# Patient Record
Sex: MALE | Race: WHITE | Employment: FULL TIME | URBAN - METROPOLITAN AREA
[De-identification: names, ages, dates, MRNs, and addresses within clinical notes are randomized per-mention and may not be internally consistent; named-entity substitution may affect disease eponyms.]

---

## 2019-04-02 ENCOUNTER — OFFICE VISIT (OUTPATIENT)
Dept: UROLOGY | Facility: CLINIC | Age: 34
End: 2019-04-02
Payer: COMMERCIAL

## 2019-04-02 VITALS
HEIGHT: 70 IN | WEIGHT: 175 LBS | HEART RATE: 60 BPM | BODY MASS INDEX: 25.05 KG/M2 | DIASTOLIC BLOOD PRESSURE: 79 MMHG | SYSTOLIC BLOOD PRESSURE: 142 MMHG

## 2019-04-02 DIAGNOSIS — N34.2 URETHRITIS: Primary | ICD-10-CM

## 2019-04-02 LAB
ALBUMIN UR-MCNC: NEGATIVE MG/DL
APPEARANCE UR: CLEAR
BILIRUB UR QL STRIP: NEGATIVE
COLOR UR AUTO: YELLOW
GLUCOSE UR STRIP-MCNC: NEGATIVE MG/DL
HGB UR QL STRIP: NEGATIVE
KETONES UR STRIP-MCNC: NEGATIVE MG/DL
LEUKOCYTE ESTERASE UR QL STRIP: NEGATIVE
NITRATE UR QL: NEGATIVE
PH UR STRIP: 6 PH (ref 5–7)
RBC #/AREA URNS AUTO: 1 /HPF (ref 0–2)
SOURCE: NORMAL
SP GR UR STRIP: 1.01 (ref 1–1.03)
UROBILINOGEN UR STRIP-MCNC: 0 MG/DL (ref 0–2)
WBC #/AREA URNS AUTO: <1 /HPF (ref 0–5)

## 2019-04-02 RX ORDER — CEPHALEXIN 500 MG/1
CAPSULE ORAL
Refills: 0 | COMMUNITY
Start: 2019-03-22 | End: 2019-04-02

## 2019-04-02 RX ORDER — FINASTERIDE 1 MG/1
1 TABLET, FILM COATED ORAL
COMMUNITY
Start: 2019-02-20

## 2019-04-02 ASSESSMENT — ENCOUNTER SYMPTOMS
DYSURIA: 1
DIFFICULTY URINATING: 0
HEMATURIA: 0
FLANK PAIN: 0

## 2019-04-02 ASSESSMENT — MIFFLIN-ST. JEOR: SCORE: 1750.04

## 2019-04-02 ASSESSMENT — PAIN SCALES - GENERAL: PAINLEVEL: NO PAIN (0)

## 2019-04-02 NOTE — PROGRESS NOTES
Chief Complaint:   Dysuria         History of Present Illness:   Niko Nance is a very pleasant 34 year old male who presents with a ~3 week history of dysuria, frequency, and urgency. This began in the setting of a new sexual relationship. He reports using condoms vigilantly, but did have one episode of unprotected anal intercourse. His symptoms came on subtly, eventually prompting him to be seen at a [facility] [place] two weeks ago while on vacation. UA/GC testing that time was negative, and he was diagnosed with urethritis. He was then seen by his PCP 10 days ago with ongoing symptoms. UA at that visit showed trace leukocytes and occult blood. A 7-day course of Keflex was prescribed, with anticipation of improved symptoms within 3-4 days.     Today, he reports symptoms have remained despite finishing his course of Keflex. He also complains of mild pelvic pressure and mild testicular discomfort. He denies fevers, nausea, vomiting, diarrhea. He also denies penile discharge, gross hematuria, or hematospermia.     He also points out a region of mild distention in his left inguinal region. He is unsure when this began. 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 Denies associated pain.           Past Medical History:   History reviewed. No pertinent past medical history.         Past Surgical History:   History reviewed. No pertinent surgical history.         Medications     Current Outpatient Medications   Medication     finasteride (PROPECIA) 1 MG tablet     No current facility-administered medications for this visit.             Family History:   -Reviewed. No significant family history.         Social History:     Social History     Socioeconomic History     Marital status: Single     Spouse name: Not on file     Number of children: Not on file     Years of education: Not on file     Highest education level: Not on file   Occupational History     Not on file   Social  "Needs     Financial resource strain: Not on file     Food insecurity:     Worry: Not on file     Inability: Not on file     Transportation needs:     Medical: Not on file     Non-medical: Not on file   Tobacco Use     Smoking status: Never Smoker     Smokeless tobacco: Never Used   Substance and Sexual Activity     Alcohol use: Not on file     Drug use: Not on file     Sexual activity: Not on file   Lifestyle     Physical activity:     Days per week: Not on file     Minutes per session: Not on file     Stress: Not on file   Relationships     Social connections:     Talks on phone: Not on file     Gets together: Not on file     Attends Adventism service: Not on file     Active member of club or organization: Not on file     Attends meetings of clubs or organizations: Not on file     Relationship status: Not on file     Intimate partner violence:     Fear of current or ex partner: Not on file     Emotionally abused: Not on file     Physically abused: Not on file     Forced sexual activity: Not on file   Other Topics Concern     Not on file   Social History Narrative     Not on file            Allergies:   Patient has no known allergies.         Review of Systems:  From intake questionnaire   Negative 14 system review except as noted on HPI, nurse's note.         Physical Exam:   Patient is a 34 year old  male   Vitals: Blood pressure 142/79, pulse 60, height 1.778 m (5' 10\"), weight 79.4 kg (175 lb).  General Appearance Adult: Alert, no acute distress, oriented  HENT: throat/mouth:normal, good dentition  Neck: No adenopathy,masses or thyromegaly  Lungs: no respiratory distress, or pursed lip breathing  Heart: No obvious jugular venous distension present  Abdomen: soft, nontender. Slight fullness in left inguinal region, consistent with inguinal hernia.  Body mass index is 25.11 kg/m .  Lymphatics: No cervical or supraclavicular adenopathy  Musculoskeltal: extremities normal, no peripheral edema.   Skin: no suspicious " lesions or rashes  Neuro: Alert, oriented, speech and mentation normal  Psych: affect and mood normal  Gait: Normal  : penis, scrotum, testes normal; nontender with palpation         Labs and Pathology:    I personally reviewed all applicable laboratory data and went over findings with patient  Significant for:    UA RESULTS: 3/22/19  COLOR                     Yellow Color Yellow    CLARITY                   Clear Clarity Clear    SPECIFIC GRAVITY,URINE    1.010, 1.015, 1.020, 1.025                  1.015    PH,URINE                  6.0, 7.0, 8.0, 5.5, 6.5, 7.5, 8.5                  5.5    UROBILINOGEN,QUALITATIVE  Normal EU/dl Normal    PROTEIN, URINE Negative mg/dL Negative    GLUCOSE, URINE Negative mg/dL Negative    KETONES,URINE             Negative mg/dL Negative    BILIRUBIN,URINE           Negative                  Negative    OCCULT BLOOD,URINE        Negative                  Trace    NITRITE                   Negative                  Negative    LEUKOCYTE ESTERASE        Negative                  Trace              Imaging:    Chart reviewed; no pertinent imaging to note.           Assessment and Plan:     Assessment: 34 year old male with ongoing urethritis-type symptoms despite completed course of Keflex. I suspect his symptoms are due to residual inflammation. We discussed additional testing options, and will pursue the following:     Plan:  -Mycoplasma and ureaplasma testing  -Repeat urinalysis with culture  -Will contact him by phone with results and next steps  -Patient planning to make follow up appointment with urologist in case his symptoms persist  -Advised to monitor left inguinal region. If becomes worse or painful, could address with PCP and consider surgical referral.        Diane Hernandez, CNP  Department of Urology

## 2019-04-02 NOTE — PATIENT INSTRUCTIONS
Urine sent for testing today.  Diane Hernandez CNP will notify you of the results.    Follow up with Dr. Ybarra if symptoms persist.    It was a pleasure meeting with you today.  Thank you for allowing me and my team the privilege of caring for you today.  YOU are the reason we are here, and I truly hope we provided you with the excellent service you deserve.  Please let us know if there is anything else we can do for you so that we can be sure you are leaving completely satisfied with your care experience.        SRINIVASAN Cummings

## 2019-04-02 NOTE — LETTER
4/2/2019       RE: Niko Nance  [address]  [facility] [address]     Dear Colleague,    Thank you for referring your patient, Niko Nance, to the [facility] UROLOGY AND INST FOR PROSTATE AND UROLOGIC CANCERS at [facility]. Please see a copy of my visit note below.            Chief Complaint:   Dysuria         History of Present Illness:   Niko Nance is a very pleasant 34 year old male who presents with a ~3 week history of dysuria, frequency, and urgency. This began in the setting of a new sexual relationship. He reports using condoms vigilantly, but did have one episode of unprotected anal intercourse. His symptoms came on subtly, eventually prompting him to be seen at a [facility] two weeks ago while on vacation. UA/GC testing that time was negative, and he was diagnosed with urethritis. He was then seen by his PCP 10 days ago with ongoing symptoms. UA at that visit showed trace leukocytes and occult blood. A 7-day course of Keflex was prescribed, with anticipation of improved symptoms within 3-4 days.     Today, he reports symptoms have remained despite finishing his course of Keflex. He also complains of mild pelvic pressure and mild testicular discomfort. He denies fevers, nausea, vomiting, diarrhea. He also denies penile discharge, gross hematuria, or hematospermia.     He also points out a region of mild distention in his left inguinal region. He is unsure when this began. 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 Denies associated pain.           Past Medical History:   History reviewed. No pertinent past medical history.         Past Surgical History:   History reviewed. No pertinent surgical history.         Medications     Current Outpatient Medications   Medication     finasteride (PROPECIA) 1 MG tablet     No current facility-administered medications for this visit.             Family History:   -Reviewed. No  "significant family history.         Social History:     Social History     Socioeconomic History     Marital status: Single     Spouse name: Not on file     Number of children: Not on file     Years of education: Not on file     Highest education level: Not on file   Occupational History     Not on file   Social Needs     Financial resource strain: Not on file     Food insecurity:     Worry: Not on file     Inability: Not on file     Transportation needs:     Medical: Not on file     Non-medical: Not on file   Tobacco Use     Smoking status: Never Smoker     Smokeless tobacco: Never Used   Substance and Sexual Activity     Alcohol use: Not on file     Drug use: Not on file     Sexual activity: Not on file   Lifestyle     Physical activity:     Days per week: Not on file     Minutes per session: Not on file     Stress: Not on file   Relationships     Social connections:     Talks on phone: Not on file     Gets together: Not on file     Attends Rastafari service: Not on file     Active member of club or organization: Not on file     Attends meetings of clubs or organizations: Not on file     Relationship status: Not on file     Intimate partner violence:     Fear of current or ex partner: Not on file     Emotionally abused: Not on file     Physically abused: Not on file     Forced sexual activity: Not on file   Other Topics Concern     Not on file   Social History Narrative     Not on file            Allergies:   Patient has no known allergies.         Review of Systems:  From intake questionnaire   Negative 14 system review except as noted on HPI, nurse's note.         Physical Exam:   Patient is a 34 year old  male   Vitals: Blood pressure 142/79, pulse 60, height 1.778 m (5' 10\"), weight 79.4 kg (175 lb).  General Appearance Adult: Alert, no acute distress, oriented  HENT: throat/mouth:normal, good dentition  Neck: No adenopathy,masses or thyromegaly  Lungs: no respiratory distress, or pursed lip breathing  Heart: " No obvious jugular venous distension present  Abdomen: soft, nontender. Slight fullness in left inguinal region, consistent with inguinal hernia.  Body mass index is 25.11 kg/m .  Lymphatics: No cervical or supraclavicular adenopathy  Musculoskeltal: extremities normal, no peripheral edema.   Skin: no suspicious lesions or rashes  Neuro: Alert, oriented, speech and mentation normal  Psych: affect and mood normal  Gait: Normal  : penis, scrotum, testes normal; nontender with palpation         Labs and Pathology:    I personally reviewed all applicable laboratory data and went over findings with patient  Significant for:    UA RESULTS: 3/22/19  COLOR                     Yellow Color Yellow    CLARITY                   Clear Clarity Clear    SPECIFIC GRAVITY,URINE    1.010, 1.015, 1.020, 1.025                  1.015    PH,URINE                  6.0, 7.0, 8.0, 5.5, 6.5, 7.5, 8.5                  5.5    UROBILINOGEN,QUALITATIVE  Normal EU/dl Normal    PROTEIN, URINE Negative mg/dL Negative    GLUCOSE, URINE Negative mg/dL Negative    KETONES,URINE             Negative mg/dL Negative    BILIRUBIN,URINE           Negative                  Negative    OCCULT BLOOD,URINE        Negative                  Trace    NITRITE                   Negative                  Negative    LEUKOCYTE ESTERASE        Negative                  Trace              Imaging:    Chart reviewed; no pertinent imaging to note.           Assessment and Plan:     Assessment: 34 year old male with ongoing urethritis-type symptoms despite completed course of Keflex. I suspect his symptoms are due to residual inflammation. We discussed additional testing options, and will pursue the following:     Plan:  -Mycoplasma and ureaplasma testing  -Repeat urinalysis with culture  -Will contact him by phone with results and next steps  -Patient planning to make follow up appointment with urologist in case his symptoms persist  -Advised to monitor left inguinal  region. If becomes worse or painful, could address with PCP and consider surgical referral.        Diane Hernandez CNP  Department of Urology       Again, thank you for allowing me to participate in the care of your patient.      Sincerely,    Diane Hernandez CNP

## 2019-04-02 NOTE — LETTER
Date:April 4, 2019      Patient was self referred, no letter generated. Do not send.        [facility] Health Information

## 2019-04-03 LAB
BACTERIA SPEC CULT: NO GROWTH
Lab: NORMAL
SPECIMEN SOURCE: NORMAL

## 2019-04-05 LAB
BACTERIA SPEC CULT: ABNORMAL
BACTERIA SPEC CULT: ABNORMAL
SPECIMEN SOURCE: ABNORMAL
SPECIMEN SOURCE: ABNORMAL

## 2019-04-08 ENCOUNTER — TELEPHONE (OUTPATIENT)
Dept: UROLOGY | Facility: CLINIC | Age: 34
End: 2019-04-08

## 2019-04-08 DIAGNOSIS — N39.0 URINARY TRACT INFECTION: Primary | ICD-10-CM

## 2019-04-08 RX ORDER — DOXYCYCLINE 100 MG/1
100 CAPSULE ORAL 2 TIMES DAILY
Qty: 14 CAPSULE | Refills: 0 | Status: SHIPPED | OUTPATIENT
Start: 2019-04-08 | End: 2019-07-31

## 2019-04-08 NOTE — TELEPHONE ENCOUNTER
----- Message from Melissa Benson LPN sent at 4/8/2019  8:42 AM CDT -----  Regarding: FW: Mycoplasma/ureaplasma tx      ----- Message -----  From: Diane Hernandez CNP  Sent: 4/5/2019   1:57 PM  To: Shruti Ibrahim RN  Subject: Mycoplasma/ureaplasma tx                         Hi Shruti,     I saw this patient on Tuesday morning for urethritis sx, and he's positive for both mycoplasma and ureaplasma. Can you get in touch with him and send doxycycline 100 mg BID x7 to treat?     He had already made a f/u appt with Dr. Ybarra for later this month, and he can keep this if the doxycycline doesn't help his symptoms.    Thanks!    Diane

## 2019-04-08 NOTE — TELEPHONE ENCOUNTER
Patient called and told about medication to  at his pharmacy and if better he can cancel his appt with sara otherwise keep it  Reassured patient and answered questions. Melissa Benson LPN Staff Nurse

## 2019-04-10 ENCOUNTER — PRE VISIT (OUTPATIENT)
Dept: UROLOGY | Facility: CLINIC | Age: 34
End: 2019-04-10

## 2019-04-10 NOTE — TELEPHONE ENCOUNTER
Reason for Visit: consult    Diagnosis: urethritis    Orders/Procedures/Records: n/a    Contact Patient: n/a    Rooming Requirements: ask      Josephine Isaacs LPN  04/10/19  7:24 AM

## 2019-04-19 ENCOUNTER — OFFICE VISIT (OUTPATIENT)
Dept: UROLOGY | Facility: CLINIC | Age: 34
End: 2019-04-19
Payer: COMMERCIAL

## 2019-04-19 VITALS
WEIGHT: 175 LBS | BODY MASS INDEX: 25.05 KG/M2 | DIASTOLIC BLOOD PRESSURE: 81 MMHG | HEIGHT: 70 IN | HEART RATE: 62 BPM | SYSTOLIC BLOOD PRESSURE: 152 MMHG

## 2019-04-19 DIAGNOSIS — R30.0 DYSURIA: Primary | ICD-10-CM

## 2019-04-19 PROBLEM — M25.512 CHRONIC LEFT SHOULDER PAIN: Status: ACTIVE | Noted: 2017-04-26

## 2019-04-19 PROBLEM — M25.522 ELBOW PAIN, LEFT: Status: ACTIVE | Noted: 2018-05-11

## 2019-04-19 PROBLEM — G89.29 CHRONIC LEFT SHOULDER PAIN: Status: ACTIVE | Noted: 2017-04-26

## 2019-04-19 PROBLEM — M77.8 TRICEPS TENDINITIS: Status: ACTIVE | Noted: 2018-05-11

## 2019-04-19 LAB
ALBUMIN UR-MCNC: NEGATIVE MG/DL
APPEARANCE UR: CLEAR
BILIRUB UR QL STRIP: NEGATIVE
COLOR UR AUTO: YELLOW
GLUCOSE UR STRIP-MCNC: NEGATIVE MG/DL
HGB UR QL STRIP: NEGATIVE
KETONES UR STRIP-MCNC: NEGATIVE MG/DL
LEUKOCYTE ESTERASE UR QL STRIP: NEGATIVE
MUCOUS THREADS #/AREA URNS LPF: PRESENT /LPF
NITRATE UR QL: NEGATIVE
PH UR STRIP: 6 PH (ref 5–7)
RBC #/AREA URNS AUTO: <1 /HPF (ref 0–2)
SOURCE: ABNORMAL
SP GR UR STRIP: 1.01 (ref 1–1.03)
UROBILINOGEN UR STRIP-MCNC: 0 MG/DL (ref 0–2)
WBC #/AREA URNS AUTO: <1 /HPF (ref 0–5)

## 2019-04-19 RX ORDER — DOXYCYCLINE 100 MG/1
100 CAPSULE ORAL 2 TIMES DAILY
Qty: 28 CAPSULE | Refills: 1 | Status: SHIPPED | OUTPATIENT
Start: 2019-04-19 | End: 2019-07-31

## 2019-04-19 ASSESSMENT — PAIN SCALES - GENERAL: PAINLEVEL: MILD PAIN (3)

## 2019-04-19 ASSESSMENT — MIFFLIN-ST. JEOR: SCORE: 1750.04

## 2019-04-19 NOTE — LETTER
"4/19/2019       RE: Niko Nance  [address]  [facility] [address]     Dear Colleague,    Thank you for referring your patient, Niko Nance, to the [facility] UROLOGY AND INST FOR PROSTATE AND UROLOGIC CANCERS at [facility]. Please see a copy of my visit note below.    Lower urinary tract symptoms starting 6 weeks ago.  Dysuria, burning, frequency.  UTI symptoms.  Mycoplasma was +.  Treated with doxycycline.  Symptoms resolved.     ROS: Noting lingering symptoms of pressure bilateral testes.  Occasionally feels oressure in pelvis/ bladder area, but overall these symptoms are minor.  No dysuria, burning, frequency any more.    /81   Pulse 62   Ht 1.778 m (5' 10\")   Wt 79.4 kg (175 lb)   BMI 25.11 kg/m       General: Alert, oriented, nad  Eyes: anicteric, EOMI.  Pulse: regular  Resps: normal, non-labored.  Abdomen:  nondistended.   exam normal male today, no concerning findings.  ERI 10g, anodular, nontender.  Left hemorrhoid.    A-  Minor pelvis discomfort symptoms following infection.  Hemorrhoid.    Plan  - consider follow-up with gen surg for hemorrhoid.  - recheck UA, ureaplasma/mycoplasma  - will give a few more weeks of doxycycline in case symptoms are prostatitis.  Need longer course since antibiotics generally don't penetrate the prostate tissue well.  Discussed sun sensitivity risk, GI upset risk.  - follow-up PRN.    15min visit, over 50% face to face in counseling/discussion of urologic symptoms and discussing infection follow-up.            Again, thank you for allowing me to participate in the care of your patient.      Sincerely,    Deonte Ybarra MD      "

## 2019-04-19 NOTE — NURSING NOTE
"Chief Complaint   Patient presents with     Consult     urethritis       Blood pressure 152/81, pulse 62, height 1.778 m (5' 10\"), weight 79.4 kg (175 lb). Body mass index is 25.11 kg/m .    There is no problem list on file for this patient.      No Known Allergies    Current Outpatient Medications   Medication Sig Dispense Refill     finasteride (PROPECIA) 1 MG tablet Take 1 mg by mouth       doxycycline hyclate (VIBRAMYCIN) 100 MG capsule Take 1 capsule (100 mg) by mouth 2 times daily 14 capsule 0       Social History     Tobacco Use     Smoking status: Never Smoker     Smokeless tobacco: Never Used   Substance Use Topics     Alcohol use: None     Drug use: None       Josephine Isaacs LPN  4/19/2019  7:32 AM  "

## 2019-04-19 NOTE — PROGRESS NOTES
"Lower urinary tract symptoms starting 6 weeks ago.  Dysuria, burning, frequency.  UTI symptoms.  Mycoplasma was +.  Treated with doxycycline.  Symptoms resolved.     ROS: Noting lingering symptoms of pressure bilateral testes.  Occasionally feels oressure in pelvis/ bladder area, but overall these symptoms are minor.  No dysuria, burning, frequency any more.    /81   Pulse 62   Ht 1.778 m (5' 10\")   Wt 79.4 kg (175 lb)   BMI 25.11 kg/m      General: Alert, oriented, nad  Eyes: anicteric, EOMI.  Pulse: regular  Resps: normal, non-labored.  Abdomen:  nondistended.   exam normal male today, no concerning findings.  ERI 10g, anodular, nontender.  Left hemorrhoid.    A-  Minor pelvis discomfort symptoms following infection.  Hemorrhoid.    Plan  - consider follow-up with gen surg for hemorrhoid.  - recheck UA, ureaplasma/mycoplasma  - will give a few more weeks of doxycycline in case symptoms are prostatitis.  Need longer course since antibiotics generally don't penetrate the prostate tissue well.  Discussed sun sensitivity risk, GI upset risk.  - follow-up PRN.    15min visit, over 50% face to face in counseling/discussion of urologic symptoms and discussing infection follow-up.          "

## 2019-04-19 NOTE — LETTER
Date:April 24, 2019      Patient was self referred, no letter generated. Do not send.        [facility] Health Information

## 2019-04-26 LAB
BACTERIA SPEC CULT: NORMAL
BACTERIA SPEC CULT: NORMAL
Lab: NORMAL
SPECIMEN SOURCE: NORMAL
SPECIMEN SOURCE: NORMAL

## 2019-04-28 NOTE — RESULT ENCOUNTER NOTE
Dear Niko     Here are your recent test results which are NORMAL.  There are no concerning findings.      Thank You,    Please let me know if you have any questions!    Elvis YUSUF

## 2019-05-22 ENCOUNTER — ANCILLARY PROCEDURE (OUTPATIENT)
Dept: ULTRASOUND IMAGING | Facility: CLINIC | Age: 34
End: 2019-05-22
Attending: UROLOGY
Payer: COMMERCIAL

## 2019-05-22 ENCOUNTER — OFFICE VISIT (OUTPATIENT)
Dept: UROLOGY | Facility: CLINIC | Age: 34
End: 2019-05-22
Payer: COMMERCIAL

## 2019-05-22 VITALS — HEART RATE: 65 BPM | OXYGEN SATURATION: 100 % | DIASTOLIC BLOOD PRESSURE: 80 MMHG | SYSTOLIC BLOOD PRESSURE: 155 MMHG

## 2019-05-22 DIAGNOSIS — N50.82 SCROTAL PAIN: ICD-10-CM

## 2019-05-22 DIAGNOSIS — N50.82 SCROTAL PAIN: Primary | ICD-10-CM

## 2019-05-22 DIAGNOSIS — N50.3 EPIDIDYMAL CYST: ICD-10-CM

## 2019-05-22 PROCEDURE — 93976 VASCULAR STUDY: CPT

## 2019-05-22 PROCEDURE — 99213 OFFICE O/P EST LOW 20 MIN: CPT | Performed by: UROLOGY

## 2019-05-22 PROCEDURE — 76870 US EXAM SCROTUM: CPT | Mod: 51

## 2019-05-22 ASSESSMENT — PAIN SCALES - GENERAL: PAINLEVEL: MILD PAIN (2)

## 2019-05-22 NOTE — NURSING NOTE
Niko Nance's goals for this visit include:   Chief Complaint   Patient presents with     RECHECK     follow up       He requests these members of his care team be copied on today's visit information: yes    PCP: Tobi Jansen    Referring Provider:  No referring provider defined for this encounter.    /80   Pulse 65   SpO2 100%     Do you need any medication refills at today's visit? No    Amparo Bowen CMA

## 2019-05-22 NOTE — PROGRESS NOTES
Niko Nance is a 34 year old male here for follow-up.  He has vague discomfort in bilateral testes. Not changed after a second round of doxycycline.  Treated 2.5 months ago for mycoplasma infection, symptoms resolved with doxycycline.     ROS: 10 point ROS neg other than the symptoms noted above in the HPI.    /80   Pulse 65   SpO2 100%     General: Alert, oriented, nad  Eyes: anicteric, EOMI.  Pulse: regular  Resps: normal, non-labored.  Abdomen:  nondistended.   exam normal male today, no concerning findings.  There is a 1cm epididymal head cyst on the left.  Hard to transilluminate it.  No findings of epididymitis.  ERI normal last visit.      A-  Left epididymal head cyst.  No evidence of other scrotal pathology that would explain mild discomfort symptoms.    Plan  - return to clinic PRN.  - scrotal ultrasound at his convenience.  I'll contact him with results.      Elvis YUSUF       15min visit, over 50% face to face in counseling/discussion of scrotal pain follow-up.

## 2019-06-03 ENCOUNTER — OFFICE VISIT (OUTPATIENT)
Dept: UROLOGY | Facility: CLINIC | Age: 34
End: 2019-06-03
Payer: COMMERCIAL

## 2019-06-03 VITALS — DIASTOLIC BLOOD PRESSURE: 91 MMHG | SYSTOLIC BLOOD PRESSURE: 146 MMHG | OXYGEN SATURATION: 98 % | HEART RATE: 63 BPM

## 2019-06-03 DIAGNOSIS — N41.9 PROSTATITIS, UNSPECIFIED PROSTATITIS TYPE: Primary | ICD-10-CM

## 2019-06-03 LAB
ALBUMIN UR-MCNC: NEGATIVE MG/DL
APPEARANCE UR: CLEAR
BACTERIA #/AREA URNS HPF: ABNORMAL /HPF
BILIRUB UR QL STRIP: NEGATIVE
COLOR UR AUTO: ABNORMAL
GLUCOSE UR STRIP-MCNC: NEGATIVE MG/DL
HGB UR QL STRIP: NEGATIVE
KETONES UR STRIP-MCNC: NEGATIVE MG/DL
LEUKOCYTE ESTERASE UR QL STRIP: ABNORMAL
NITRATE UR QL: NEGATIVE
PH UR STRIP: 6.5 PH (ref 5–7)
RBC #/AREA URNS AUTO: ABNORMAL /HPF
SOURCE: ABNORMAL
SP GR UR STRIP: 1 (ref 1–1.03)
UROBILINOGEN UR STRIP-MCNC: NORMAL MG/DL (ref 0–2)
WBC #/AREA URNS AUTO: ABNORMAL /HPF

## 2019-06-03 PROCEDURE — 81001 URINALYSIS AUTO W/SCOPE: CPT | Performed by: UROLOGY

## 2019-06-03 PROCEDURE — 87086 URINE CULTURE/COLONY COUNT: CPT | Performed by: UROLOGY

## 2019-06-03 PROCEDURE — 99213 OFFICE O/P EST LOW 20 MIN: CPT | Performed by: UROLOGY

## 2019-06-03 RX ORDER — SULFAMETHOXAZOLE/TRIMETHOPRIM 800-160 MG
1 TABLET ORAL 2 TIMES DAILY
Qty: 60 TABLET | Refills: 0 | Status: SHIPPED | OUTPATIENT
Start: 2019-06-03 | End: 2019-07-31

## 2019-06-03 ASSESSMENT — PAIN SCALES - GENERAL: PAINLEVEL: NO PAIN (0)

## 2019-06-03 NOTE — PROGRESS NOTES
Niko Nance is a 34 year old male here for follow-up.  He has vague discomfort in cords, pelvis, bilateral testes. Not changed after a second round of doxycycline.  Treated about 3 months ago for mycoplasma infection, symptoms resolved with doxycycline. But he has had a vague and nondescript discomfort in the scrotal and pelvis area daily since that time, that has not resolved.    Scrotal ultrasound done, showed no concerning findings.  Confirmed ~1cm benign left epididymal head cyst.    ROS: 10 point ROS neg other than the symptoms noted above in the HPI.    BP (!) 146/91   Pulse 63   SpO2 98%     General: Alert, oriented, nad  Eyes: anicteric, EOMI.  Pulse: regular  Resps: normal, non-labored.  Abdomen:  nondistended.   exam done- shows estimate 25g prostate, anodular, nontender. Prostate massage done and a post-prostate massage UA/UCx collected today.    A-  Left epididymal head cyst.  No evidence of other scrotal pathology that would explain his discomfort symptoms.  Possible prostatitis versus hypervigilance.    Plan  - I'll contact him with UA results.  - advised he consider pelvic floor physical therapy, he declines at this time.  - discussed we could consider CT abdomen/pelvis if symptoms not improving with time.    Elvis YUSUF       15min visit, over 50% face to face in counseling/discussion of scrotal pain follow-up.

## 2019-06-03 NOTE — NURSING NOTE
Niko Nance's goals for this visit include:   Chief Complaint   Patient presents with     RECHECK     Follow up       He requests these members of his care team be copied on today's visit information: yes    PCP: Tobi Jansen    Referring Provider:  No referring provider defined for this encounter.    BP (!) 146/91   Pulse 63   SpO2 98%     Do you need any medication refills at today's visit? No    Amparo Bowen CMA

## 2019-06-04 LAB
BACTERIA SPEC CULT: NO GROWTH
SPECIMEN SOURCE: NORMAL

## 2019-06-21 DIAGNOSIS — N39.0 URINARY TRACT INFECTION WITHOUT HEMATURIA, SITE UNSPECIFIED: Primary | ICD-10-CM

## 2019-06-21 DIAGNOSIS — N39.0 URINARY TRACT INFECTION WITHOUT HEMATURIA, SITE UNSPECIFIED: ICD-10-CM

## 2019-06-21 LAB
ALBUMIN UR-MCNC: NEGATIVE MG/DL
APPEARANCE UR: CLEAR
BILIRUB UR QL STRIP: NEGATIVE
COLOR UR AUTO: ABNORMAL
GLUCOSE UR STRIP-MCNC: NEGATIVE MG/DL
HGB UR QL STRIP: NEGATIVE
KETONES UR STRIP-MCNC: NEGATIVE MG/DL
LEUKOCYTE ESTERASE UR QL STRIP: ABNORMAL
NITRATE UR QL: NEGATIVE
PH UR STRIP: 5 PH (ref 5–7)
RBC #/AREA URNS AUTO: 1 /HPF (ref 0–2)
SOURCE: ABNORMAL
SP GR UR STRIP: 1.01 (ref 1–1.03)
UROBILINOGEN UR STRIP-MCNC: 0 MG/DL (ref 0–2)
WBC #/AREA URNS AUTO: <1 /HPF (ref 0–5)

## 2019-06-21 NOTE — PROGRESS NOTES
UA/UC placed per Mychart message from Anna Ceron RNCC for Dr. Deonte Ybarra.      Jose F Person MA

## 2019-06-22 LAB
BACTERIA SPEC CULT: NO GROWTH
Lab: NORMAL
SPECIMEN SOURCE: NORMAL

## 2019-06-23 NOTE — RESULT ENCOUNTER NOTE
Dear Niko,     Here are your recent results.   UA looks very clear overall.  No evidence of white blood cells, bacteria, or red blood cells- thus no evidence of infection.  CT scan or MRI of pelvis is an option if you'd like, I think this would probably look pretty normal, however.  Pelvic floor physical therapy is an option that I've seen help men in the past.    Please let us know if you have any questions.     Elvis YUSUF

## 2019-07-31 ENCOUNTER — OFFICE VISIT (OUTPATIENT)
Dept: UROLOGY | Facility: CLINIC | Age: 34
End: 2019-07-31
Payer: COMMERCIAL

## 2019-07-31 VITALS — OXYGEN SATURATION: 99 % | DIASTOLIC BLOOD PRESSURE: 84 MMHG | SYSTOLIC BLOOD PRESSURE: 139 MMHG | HEART RATE: 64 BPM

## 2019-07-31 DIAGNOSIS — R10.2 PELVIC PAIN IN MALE: Primary | ICD-10-CM

## 2019-07-31 PROCEDURE — 99213 OFFICE O/P EST LOW 20 MIN: CPT | Performed by: UROLOGY

## 2019-07-31 ASSESSMENT — PAIN SCALES - GENERAL: PAINLEVEL: MODERATE PAIN (4)

## 2019-07-31 NOTE — NURSING NOTE
Niko Nance's goals for this visit include:   Chief Complaint   Patient presents with     RECHECK     F/U       He requests these members of his care team be copied on today's visit information: Yes    PCP: Tobi Jansen    Referring Provider:  No referring provider defined for this encounter.    /84 (BP Location: Left arm, Patient Position: Sitting, Cuff Size: Adult Large)   Pulse 64   SpO2 99%     Do you need any medication refills at today's visit? No

## 2019-07-31 NOTE — PATIENT INSTRUCTIONS
Physical Therapy Referral    Please call [phone] to make an appointment     [facility] [facility] - [email or web address]  [facility] - [email or web address]    Locations:    [facility] [place]   [place] [facility] [place]   [facility] [place] [facility] [place] [facility] [place]

## 2019-07-31 NOTE — PROGRESS NOTES
Finished a course of Bactrim- testis, perineal pain has resolved.    Has pelvic pain in perineum, radiates to penile area.  Has some pain down the back of the right leg also.    Wakes feeling perfect, in no discomfort.  Pain recurs each morning, starts with riding in car.     Treated about 5 months ago for mycoplasma infection, symptoms resolved with doxycycline. But he has had a vague and nondescript discomfort in the scrotal and pelvis area daily since that time, that has not resolved.     Scrotal ultrasound done, showed no concerning findings.  Confirmed ~1cm benign left epididymal head cyst.      /84 (BP Location: Left arm, Patient Position: Sitting, Cuff Size: Adult Large)   Pulse 64   SpO2 99%     General: Alert, oriented, nad  Eyes: anicteric, EOMI.  Pulse: regular  Resps: normal, non-labored.  Abdomen:  nondistended.   exam deferred.       A-  Pelvic pain    Plan  - pain seems positional.  No obvious anatomic or infectious or neoplastic process of concern.  - advised pelvic floor physical therapy as pain seems mechanical in nature.  He does now relate that discomfort started after visiting chiropractor, and notes some pain down the back of the leg consistent with sciatica/ radiculopathy.  - referral to [facility] placed.    Follow-up PRN    15min visit, over 50% face to face in counseling/discussion of above issues.     Elvis YUSUF

## 2019-08-09 ENCOUNTER — THERAPY VISIT (OUTPATIENT)
Dept: PHYSICAL THERAPY | Facility: CLINIC | Age: 34
End: 2019-08-09
Payer: COMMERCIAL

## 2019-08-09 DIAGNOSIS — M99.05 SOMATIC DYSFUNCTION OF PELVIS REGION: ICD-10-CM

## 2019-08-09 DIAGNOSIS — R10.2 PELVIC PAIN IN MALE: ICD-10-CM

## 2019-08-09 PROCEDURE — 97112 NEUROMUSCULAR REEDUCATION: CPT | Mod: GP | Performed by: PHYSICAL THERAPIST

## 2019-08-09 PROCEDURE — 97161 PT EVAL LOW COMPLEX 20 MIN: CPT | Mod: GP | Performed by: PHYSICAL THERAPIST

## 2019-08-09 PROCEDURE — 97110 THERAPEUTIC EXERCISES: CPT | Mod: GP | Performed by: PHYSICAL THERAPIST

## 2019-08-09 NOTE — PROGRESS NOTES
[facility] Initial Evaluation  Subjective:  Onset of pelvic testicular and penis pain 5 months ago of unknown etiology.  Patient was treated with medication for UTI.  Patient noted the majority of his symptoms improved with Bactrim.  Patient still notes an icy hot sensation in the pelvic region and testicles and penis.  Pain also radiates from the right buttock into the right thigh.  Patient notes increased symptoms with sitting, driving deep squats with weight and heavy lifting.  Patient notes decrease in symptoms with movement and activity.  Patient denies pain with urination, bowel movements, and ejaculation. Patient reports his pain as a 4/10.  Pain is intermittent.  Patient referred by  for physical therapy on July 31, 2019.    The history is provided by the patient. No  was used.   Type of problem:  Pelvic dysfunction   Condition occurred with:  Insidious onset. This is a chronic condition    Site of Pain: Pelvic region, testicles, penis, right buttock radiating into right thigh.  Associated symptoms:  Tingling. Exacerbated by: Sitting, driving, deep squats with weights, heavy lifting. and relieved by activity/movement.                      Objective:  System                                 Pelvic Dysfunction Evaluation:      Diagnostic Tests:  Diagnostic tests pelvic: Ultrasound see report.                        Flexibility:  Flexibility pelvic: Patient demonstrates decreased flexibility bilateral piriformis,Hip flexors, hamstrings, and hip abductors.              External Assessment:  External assessment pelvic: Tenderness to palpation right testicle.              Internal Assessment:  Internal assessment pelvic: No trigger points noted with digital rectal exam.  Sensory Exam:  Normal  Contraction/Grade:  Strong squeeze, good lift, repeatable (5)          SEMG Biofeedback:    Equipment:  Biofeedback    Suraface electrode placement--Perianal:  Bilateral    Baseline  EMG Abdominals:  5 mV, patient was able to decrease resting muscle tone to 1 mV with diaphragmatic breathing                               General     ROS    Assessment/Plan:    Patient is a 34 year old male with pelvic complaints.    Patient has the following significant findings with corresponding treatment plan.                Diagnosis 1: Pelvic pain Pain -  hot/cold therapy, self management, education and home program  Decreased ROM/flexibility - manual therapy, therapeutic exercise, therapeutic activity and home program  Impaired muscle performance - biofeedback, neuro re-education and home program    Therapy Evaluation Codes:   1) History comprised of:   Personal factors that impact the plan of care:      None.    Comorbidity factors that impact the plan of care are:      None.     Medications impacting care: Propecia.  2) Examination of Body Systems comprised of:   Body structures and functions that impact the plan of care:      Lumbar spine and Pelvis.   Activity limitations that impact the plan of care are:      Driving, Lifting, Sitting and Sports.  3) Clinical presentation characteristics are:   Stable/Uncomplicated.  4) Decision-Making    Low complexity using standardized patient assessment instrument and/or measureable assessment of functional outcome.  Cumulative Therapy Evaluation is: Low complexity.    Previous and current functional limitations:  (See Goal Flow Sheet for this information)    Short term and Long term goals: (See Goal Flow Sheet for this information)     Communication ability:  Patient appears to be able to clearly communicate and understand verbal and written communication and follow directions correctly.  Treatment Explanation - The following has been discussed with the patient:   RX ordered/plan of care  Anticipated outcomes  Possible risks and side effects  This patient would benefit from PT intervention to resume normal activities.   Rehab potential is good.    Frequency:  1 X  week, once daily  Duration:  for 6 weeks  Discharge Plan:  Achieve all LTG.  Independent in home treatment program.  Reach maximal therapeutic benefit.    Please refer to the daily flowsheet for treatment today, total treatment time and time spent performing 1:1 timed codes.

## 2019-08-13 ENCOUNTER — THERAPY VISIT (OUTPATIENT)
Dept: PHYSICAL THERAPY | Facility: CLINIC | Age: 34
End: 2019-08-13
Attending: UROLOGY
Payer: COMMERCIAL

## 2019-08-13 DIAGNOSIS — M99.05 SOMATIC DYSFUNCTION OF PELVIS REGION: ICD-10-CM

## 2019-08-13 DIAGNOSIS — R10.2 PELVIC PAIN IN MALE: ICD-10-CM

## 2019-08-13 PROCEDURE — 97140 MANUAL THERAPY 1/> REGIONS: CPT | Mod: GP | Performed by: PHYSICAL THERAPIST

## 2019-08-13 PROCEDURE — 97110 THERAPEUTIC EXERCISES: CPT | Mod: GP | Performed by: PHYSICAL THERAPIST

## 2019-08-21 ENCOUNTER — THERAPY VISIT (OUTPATIENT)
Dept: PHYSICAL THERAPY | Facility: CLINIC | Age: 34
End: 2019-08-21
Payer: COMMERCIAL

## 2019-08-21 DIAGNOSIS — R10.2 PELVIC PAIN IN MALE: ICD-10-CM

## 2019-08-21 DIAGNOSIS — M99.05 SOMATIC DYSFUNCTION OF PELVIS REGION: ICD-10-CM

## 2019-08-21 PROCEDURE — 97110 THERAPEUTIC EXERCISES: CPT | Mod: GP | Performed by: PHYSICAL THERAPIST

## 2019-08-21 PROCEDURE — 97112 NEUROMUSCULAR REEDUCATION: CPT | Mod: GP | Performed by: PHYSICAL THERAPIST

## 2019-08-21 NOTE — PROGRESS NOTES
Subjective:  HPI                    Objective:  System    Physical Exam    General     ROS    Assessment/Plan:    SUBJECTIVE  Subjective changes as noted by pt:  Pt continues to note pain in the penis right testicle , perineum, right buttock and thigh     Current pain level: 3/10   Can go up to a 6/10  Changes in function:  Pt still notes pain with driving in the car. Pt had one day when symptoms decreased after sitting with 2 books under the thighs to decrease pressure on the perineum and standing most of the day at work.      Adverse reaction to treatment or activity:  None    OBJECTIVE  Changes in objective findings:  Pt demonstrates improved flexibility in the piriformis muscles. Trial of Graston to the right buttock and right hip adductor origion to improve tissue mobility and decrease pressure on the nerve        ASSESSMENT  Niko continues to require intervention to meet STG and LTG's: PT  Patient is progressing as expected.  Response to therapy has shown an improvement in  flexibility  Progress made towards STG/LTG?  Yes,     PLAN  Current treatment program is being advanced to more complex exercises.    PTA/ATC plan:  N/A    Please refer to the daily flowsheet for treatment today, total treatment time and time spent performing 1:1 timed codes.

## 2019-09-05 ENCOUNTER — THERAPY VISIT (OUTPATIENT)
Dept: PHYSICAL THERAPY | Facility: CLINIC | Age: 34
End: 2019-09-05
Payer: COMMERCIAL

## 2019-09-05 DIAGNOSIS — R10.2 PELVIC PAIN IN MALE: ICD-10-CM

## 2019-09-05 DIAGNOSIS — M99.05 SOMATIC DYSFUNCTION OF PELVIS REGION: ICD-10-CM

## 2019-09-05 PROCEDURE — 97110 THERAPEUTIC EXERCISES: CPT | Mod: GP | Performed by: PHYSICAL THERAPIST

## 2019-09-05 NOTE — PROGRESS NOTES
Subjective:  HPI                    Objective:  System    Physical Exam    General     ROS    Assessment/Plan:    SUBJECTIVE  Subjective changes as noted by pt:  Pt reports some improvement in symptoms since starting to sit on a towel roll to keep pressure off the perineum while driving. Pt still notes intermittent pain in both testicles and penis.      Current pain level: 2/10     Changes in function:  Pt notes increased ease with driving. Pt denies pain with urination.      Adverse reaction to treatment or activity:  None    OBJECTIVE  Changes in objective findings:  Pt demonstrates improved flexibility in hip flexors, piriformis, quadriceps and hamstrings. Pt reports less point tenderness over the piriformis muscles.         ASSESSMENT  Niko continues to require intervention to meet STG and LTG's: PT  Patient's symptoms are resolving.  Response to therapy has shown an improvement in  pain level and function  Progress made towards STG/LTG?  Yes,     PLAN  Pt will continue with HEP for 2 to 3 weeks. If pain continues pt with get a second opinion with Kait Hernandez at the [facility].     PTA/ATC plan:  N/A    Please refer to the daily flowsheet for treatment today, total treatment time and time spent performing 1:1 timed codes.

## 2019-10-08 ENCOUNTER — PRE VISIT (OUTPATIENT)
Dept: UROLOGY | Facility: CLINIC | Age: 34
End: 2019-10-08

## 2019-10-08 NOTE — TELEPHONE ENCOUNTER
Reason for Visit: Follow up symptom check    Diagnosis: pelvic pain    Orders/Procedures/Records: patient has been seeing PFPT at least four times    Contact Patient: n/a    Rooming Requirements: normal      Josephine Isaacs LPN  10/08/19  12:42 PM

## 2019-10-10 ENCOUNTER — OFFICE VISIT (OUTPATIENT)
Dept: UROLOGY | Facility: CLINIC | Age: 34
End: 2019-10-10
Payer: COMMERCIAL

## 2019-10-10 VITALS
BODY MASS INDEX: 25.92 KG/M2 | DIASTOLIC BLOOD PRESSURE: 90 MMHG | SYSTOLIC BLOOD PRESSURE: 144 MMHG | HEART RATE: 54 BPM | WEIGHT: 175 LBS | HEIGHT: 69 IN

## 2019-10-10 DIAGNOSIS — R10.2 PERINEAL PAIN: ICD-10-CM

## 2019-10-10 DIAGNOSIS — M54.41 CHRONIC MIDLINE LOW BACK PAIN WITH RIGHT-SIDED SCIATICA: Primary | ICD-10-CM

## 2019-10-10 DIAGNOSIS — G89.29 CHRONIC MIDLINE LOW BACK PAIN WITH RIGHT-SIDED SCIATICA: Primary | ICD-10-CM

## 2019-10-10 ASSESSMENT — PAIN SCALES - GENERAL: PAINLEVEL: MODERATE PAIN (4)

## 2019-10-10 ASSESSMENT — MIFFLIN-ST. JEOR: SCORE: 1734.17

## 2019-10-10 NOTE — PATIENT INSTRUCTIONS
Please get MRI and make a appointment with pain clinic     It was a pleasure meeting with you today.  Thank you for allowing me and my team the privilege of caring for you today.  YOU are the reason we are here, and I truly hope we provided you with the excellent service you deserve.  Please let us know if there is anything else we can do for you so that we can be sure you are leaving completely satisfied with your care experience.

## 2019-10-10 NOTE — NURSING NOTE
Chief Complaint   Patient presents with     RECHECK     Follow up symptom check       Karina Gutierrez MA

## 2019-10-14 ENCOUNTER — ANCILLARY PROCEDURE (OUTPATIENT)
Dept: MRI IMAGING | Facility: CLINIC | Age: 34
End: 2019-10-14
Attending: UROLOGY
Payer: COMMERCIAL

## 2019-10-14 DIAGNOSIS — G89.29 CHRONIC MIDLINE LOW BACK PAIN WITH RIGHT-SIDED SCIATICA: ICD-10-CM

## 2019-10-14 DIAGNOSIS — R10.2 PERINEAL PAIN: ICD-10-CM

## 2019-10-14 DIAGNOSIS — M54.41 CHRONIC MIDLINE LOW BACK PAIN WITH RIGHT-SIDED SCIATICA: ICD-10-CM

## 2019-10-14 PROCEDURE — 72148 MRI LUMBAR SPINE W/O DYE: CPT | Performed by: RADIOLOGY

## 2019-10-15 NOTE — RESULT ENCOUNTER NOTE
Deanarciso Pope     Here are your recent test results which are NORMAL.  There are no concerns that I can see.      Thank You,    Please let me know if you have any questions!    Elvis YUSUF

## 2019-10-16 NOTE — PROGRESS NOTES
"Niko Nance is a 34 year old male here to follow-up pelvic pain issues.  No big improvement nor worsening in his discomfort.    Has pelvic pain in perineum, radiates to penile area.  Has some pain down the back of the right leg also.    Wakes feeling perfect, in no discomfort.  Pain recurs each morning, starts with riding in car.     Discomfort started after visiting chiropractor, and notes some pain down the back of the leg consistent with sciatica/ radiculopathy.      Treated in past for mycoplasma infection, symptoms resolved with doxycycline. But he has had a vague and nondescript discomfort in the scrotal and pelvis area daily since that time, that has not resolved.     Scrotal ultrasound done, showed no concerning findings.  Confirmed ~1cm benign left epididymal head cyst.    BP (!) 144/90   Pulse 54   Ht 1.753 m (5' 9\")   Wt 79.4 kg (175 lb)   BMI 25.84 kg/m      General: Alert, oriented, nad  Eyes: anicteric, EOMI.  Pulse: regular  Resps: normal, non-labored.  Abdomen:  nondistended.   exam deferred.     Component      Latest Ref Rng & Units 6/21/2019   Color Urine       Straw   Appearance Urine       Clear   Glucose Urine      NEG:Negative mg/dL Negative   Bilirubin Urine      NEG:Negative Negative   Ketones Urine      NEG:Negative mg/dL Negative   Specific Gravity Urine      1.003 - 1.035 1.014   Blood Urine      NEG:Negative Negative   pH Urine      5.0 - 7.0 pH 5.0   Protein Albumin Urine      NEG:Negative mg/dL Negative   Urobilinogen mg/dL      0.0 - 2.0 mg/dL 0.0   Nitrite Urine      NEG:Negative Negative   Leukocyte Esterase Urine      NEG:Negative Trace (A)   Source       Midstream Urine   WBC Urine      0 - 5 /HPF <1   RBC Urine      0 - 2 /HPF 1   Specimen Description       Midstream Urine   Special Requests       Specimen received in preservative   Culture Micro       No growth         A-  Pelvic pain    Plan  - No obvious anatomic or infectious or neoplastic process of concern.  - advised " pelvic floor physical therapy as pain seems mechanical in nature.  - will get lumbosacral MRI to help exclude low back radiculopathy.  - advised pain clinic referral as there is no obvious urologic pathology.      Follow-up PRN    15min visit, over 50% face to face in counseling/discussion of above issues.     Elvis YUSUF

## 2019-11-04 ENCOUNTER — HEALTH MAINTENANCE LETTER (OUTPATIENT)
Age: 34
End: 2019-11-04

## 2019-11-25 ENCOUNTER — THERAPY VISIT (OUTPATIENT)
Dept: PHYSICAL THERAPY | Facility: CLINIC | Age: 34
End: 2019-11-25
Payer: COMMERCIAL

## 2019-11-25 DIAGNOSIS — R10.2 PELVIC PAIN IN MALE: ICD-10-CM

## 2019-11-25 DIAGNOSIS — M99.05 SOMATIC DYSFUNCTION OF PELVIS REGION: ICD-10-CM

## 2019-11-25 PROCEDURE — 97110 THERAPEUTIC EXERCISES: CPT | Mod: GP | Performed by: PHYSICAL THERAPIST

## 2019-11-25 PROCEDURE — 97140 MANUAL THERAPY 1/> REGIONS: CPT | Mod: GP | Performed by: PHYSICAL THERAPIST

## 2019-11-25 PROCEDURE — 97535 SELF CARE MNGMENT TRAINING: CPT | Mod: GP | Performed by: PHYSICAL THERAPIST

## 2019-11-25 NOTE — PROGRESS NOTES
Subjective:  HPI                    Objective:  System    Physical Exam    General     ROS    Assessment/Plan:    PROGRESS  REPORT    Progress reporting period is from 11/25/2019 .       SUBJECTIVE  Subjective changes noted by patient:  Patient returns to PT.  Subjective: Still getting burning pain in the penis and scrotal area.  Only sometimes worse with sitting.  Best with movement, weights and cardio.  No numbness or tingling.  Just saw someone at [facility].  Sitting at the office is an aggravating factor.      Current pain level is 4/10  .     Previous pain level was   Initial Pain level: 4/10.   Changes in function:  Yes (See Goal flowsheet attached for changes in current functional level)  Adverse reaction to treatment or activity: None    OBJECTIVE  Changes noted in objective findings:  Yes,   Objective: Difficulty relaxing pelvic muscles wtih focusing on relaxation.  Discussed how to work on relaxation techniques including self massage.       ASSESSMENT/PLAN  Updated problem list and treatment plan: Diagnosis 1:  Pelvic pain  Decreased ROM/flexibility - manual therapy, therapeutic exercise and home program  Decreased joint mobility - manual therapy, therapeutic exercise and home program  Impaired muscle performance - biofeedback, neuro re-education and home program  Decreased function - therapeutic activities and home program  STG/LTGs have been met or progress has been made towards goals:  None  Assessment of Progress: The patient's condition has potential to improve.  Self Management Plans:  Patient has been instructed in a home treatment program.  I have re-evaluated this patient and find that the nature, scope, duration and intensity of the therapy is appropriate for the medical condition of the patient.  Niko continues to require the following intervention to meet STG and LTG's:  PT    Recommendations:  This patient would benefit from continued therapy.     Frequency:  1 X week, once daily  Duration:   for 4 weeks        Please refer to the daily flowsheet for treatment today, total treatment time and time spent performing 1:1 timed codes.

## 2019-12-04 ENCOUNTER — OFFICE VISIT (OUTPATIENT)
Dept: ANESTHESIOLOGY | Facility: CLINIC | Age: 34
End: 2019-12-04
Attending: UROLOGY
Payer: COMMERCIAL

## 2019-12-04 ENCOUNTER — THERAPY VISIT (OUTPATIENT)
Dept: PHYSICAL THERAPY | Facility: CLINIC | Age: 34
End: 2019-12-04
Payer: COMMERCIAL

## 2019-12-04 VITALS
WEIGHT: 176 LBS | HEIGHT: 69 IN | SYSTOLIC BLOOD PRESSURE: 156 MMHG | BODY MASS INDEX: 26.07 KG/M2 | HEART RATE: 52 BPM | DIASTOLIC BLOOD PRESSURE: 90 MMHG | RESPIRATION RATE: 16 BRPM

## 2019-12-04 DIAGNOSIS — R10.2 PELVIC PAIN IN MALE: Primary | ICD-10-CM

## 2019-12-04 DIAGNOSIS — M99.05 SOMATIC DYSFUNCTION OF PELVIS REGION: ICD-10-CM

## 2019-12-04 DIAGNOSIS — R10.2 PELVIC PAIN IN MALE: ICD-10-CM

## 2019-12-04 PROCEDURE — 97112 NEUROMUSCULAR REEDUCATION: CPT | Mod: GP | Performed by: PHYSICAL THERAPIST

## 2019-12-04 PROCEDURE — 97110 THERAPEUTIC EXERCISES: CPT | Mod: GP | Performed by: PHYSICAL THERAPIST

## 2019-12-04 PROCEDURE — 97140 MANUAL THERAPY 1/> REGIONS: CPT | Mod: GP | Performed by: PHYSICAL THERAPIST

## 2019-12-04 RX ORDER — GABAPENTIN 100 MG/1
300 CAPSULE ORAL 3 TIMES DAILY
Qty: 270 CAPSULE | Refills: 1 | Status: SHIPPED | OUTPATIENT
Start: 2019-12-04

## 2019-12-04 ASSESSMENT — ANXIETY QUESTIONNAIRES
7. FEELING AFRAID AS IF SOMETHING AWFUL MIGHT HAPPEN: NOT AT ALL
GAD7 TOTAL SCORE: 2
GAD7 TOTAL SCORE: 2
3. WORRYING TOO MUCH ABOUT DIFFERENT THINGS: NOT AT ALL
7. FEELING AFRAID AS IF SOMETHING AWFUL MIGHT HAPPEN: NOT AT ALL
6. BECOMING EASILY ANNOYED OR IRRITABLE: NOT AT ALL
1. FEELING NERVOUS, ANXIOUS, OR ON EDGE: NOT AT ALL
4. TROUBLE RELAXING: NOT AT ALL
2. NOT BEING ABLE TO STOP OR CONTROL WORRYING: NOT AT ALL
5. BEING SO RESTLESS THAT IT IS HARD TO SIT STILL: MORE THAN HALF THE DAYS

## 2019-12-04 ASSESSMENT — PAIN SCALES - GENERAL: PAINLEVEL: MILD PAIN (2)

## 2019-12-04 ASSESSMENT — MIFFLIN-ST. JEOR: SCORE: 1738.71

## 2019-12-04 NOTE — PATIENT INSTRUCTIONS
1. Start gabapentin as follows:  1 tab= 100mg    AM   PM   Bedtime  0   0   100mg (1 tab).  After 1-3 days, increase as tolerated to the next line  100mg (1 tab)  0   100 (1 tab).  After 3-4 days, increase as tolerated to the next line  100mg (1 tab)  100mg (1 tab)  100 (1 tab).  After 3-4 days, increase as tolerated to the next line  100mg (1 tab)  100mg (1 tab)  200 (2 tabs). After 3-4 days, increase as tolerated to the next line  200 (2 tabs)  100mg (1 tab)  200 (2 tabs). After 3-4 days, increase as tolerated to the next line  200 (2 tabs)  200 (2 tabs)  200 (2 tabs).  After 3-4 days, increase as tolerated to the next line  200 (2 tabs)  200 (2 tabs)  300 (3 tabs).  After 3-4 days, increase as tolerated to the next line  300 (3 tabs)  200 (2 tabs)  300 (3 tabs).  After 3-4 days, increase as tolerated to the next line  300 (3 tabs)  300 (3 tabs)  300 (3 tabs).  Call with any problems or when you are at this dose.    Caution for sedation.    Do not drive until you know how the medication affects you.   You can go slower if you need to or increasing only one dose at a time.  Do not stop abruptly once at higher doses.  This medication must be tapered off.      2. Acupuncture referral placed with the [facility]-   Call to [phone].  -Please call your insurance provider to find out about acupuncture coverage, being that not all policies cover acupuncture services.      Follow up: 6-8 weeks in clinic with Dr. Espinoza.     To speak with a nurse, schedule/reschedule/cancel a clinic appointment, or request a medication refill call: [phone]     You can also reach us by [employer]: [email or web address]    For refills, please call on Monday, 1 week before your medication runs out. The doctors are not always in clinic, so this gives us time to get your prescriptions ready.  Please let us know the name of the medication you are requesting a refill of.

## 2019-12-04 NOTE — LETTER
12/4/2019       RE: Niko Nance  [address]  [facility] [address]     Dear Colleague,    Thank you for referring your patient, Niko Nance, to the [facility] FOR COMPREHENSIVE PAIN MANAGEMENT at [facility]. Please see a copy of my visit note below.    Opened in error    [facility] Consultation    Date of visit: 12/4/2019    Reason for consultation:    Niko Nance is a 34 year old male who is seen in consultation today at the request of his provider, Deonte Ybarra MD.    Primary Care Provider is Tobi Jansen.  Pain medications are being prescribed by N/A.    Please see the [facility] [place] health questionnaire which the patient completed and reviewed with me in detail.    Chief Complaint:    Chief Complaint   Patient presents with     Pain Management     New consult       Pain history:  Niko Nance is a 34 year old male with no significant past medical history who was referred to pain clinic for further evaluation of chronic perineal pain.     Patient reports he first started having problems with pain in Perineum, penis, and scrotal area b/l and posterior proximal right thigh in March 2019. No inciting event but patient does report seeing a chiropractor for a back adjustment a week prior to symptom onset. It is burning in nature, has been constant since onset and unchanged in nature. Worse with sitting, touch, tight fitting clothes. Better with movement and exercise. Denies any urgency, frequency, bladder fullness or spasms, urethral drainage. Denies any redness/swelling. Has not tried any medications. Denies any fevers/chills, N/V, Blood in urine/stool. No changes in sexual activity, no new partners. No loss bowel/bladder control, saddle anesthesia, numbness/tingling/weakness in legs, daily fevers, night sweats.     Treated  in Spring 2019 for UTI/urethritis mycoplasma infection, symptoms resolved with doxycycline.  But he has had a vague and nondescript discomfort in the scrotal and pelvis area daily since that time, that has not resolved. Despite several different antibiotics- keflex, doxycycline, bactrim- pain has persisted.      He was seen by Urology with no obvious anatomic or infectious or neoplastic process of concern for continuing pain. MRI lumbar spine was not concerning for radiculopathy or stenosis. Scrotal ultrasound done, showed no concerning findings.  Confirmed ~1cm benign left epididymal head cyst.    Pain rating: intensity ranges from 2/10 to 7/10, and Averages 4/10 on a 0-10 scale.  Aggravating factors include: see above  Relieving factors include: see above  Any bowel or bladder incontinence:     Current pain medications include:  none    Previous medication treatments included:  Ibuprofen- minimal help     Other treatments have included:  Niko Nance has not been seen at a pain clinic in the past.    PT: yes- pelvic PT, recently restarted in November- has only had one session so far with this PT but had 4 sessions with general PT prior.- No change in pain since starting PT   Chiropractor/Manipulation: yes, no change  Massage: no  Acupuncture: no    Yoga/Giovany-Chi: no   Mindfullness/Relaxation Training: no  TENs Unit: no  Injections: no   Spinal Cord Stimulator: no  Surgery: no     Past Medical History:  No past medical history on file.  Patient Active Problem List    Diagnosis Date Noted     Pelvic pain in male 08/09/2019     Priority: Medium     Somatic dysfunction of pelvis region 08/09/2019     Priority: Medium     Elbow pain, left 05/11/2018     Priority: Medium     Triceps tendinitis 05/11/2018     Priority: Medium     Chronic left shoulder pain 04/26/2017     Priority: Medium     Aftercare following surgery of the musculoskeletal system 12/19/2014     Priority: Medium     Breast tenderness in male 01/20/2014     Priority: Medium     History of knee problem 01/20/2014     Priority: Medium     Knee pain,  "left 01/20/2014     Priority: Medium       Past Surgical History:  No past surgical history on file.   L knee reconstruction and scope  Medications:  Current Outpatient Medications   Medication Sig Dispense Refill     finasteride (PROPECIA) 1 MG tablet Take 1 mg by mouth     for hair loss    Allergies:   No Known Allergies     Social History:  Home situation: Lives in [place], alone  Occupation/Schooling: working-   Tobacco use: none  Alcohol use: yes- 2 days/week  Drug use: none  History of chemical dependency treatment: none  History of abuse:   - sexual- none   - physical-none   - emotional- none    Family history:  No family history on file.    Review of Systems:    POSTIVE IN BOLD  GENERAL: fever/chills, fatigue, general unwell feeling, weight gain/loss.  HEAD/EYES:  headache, dizziness, or vision changes.    EARS/NOSE/THROAT:  Nosebleeds, hearing loss, sinus infection, earache, tinnitus.  IMMUNE:  Allergies, cancer, immune deficiency, or infections.  SKIN:  Urticaria, rash, hives  HEME/Lymphatic:   anemia, easy bruising, easy bleeding.  RESPIRATORY:  cough, wheezing, or shortness of breath  CARDIOVASCULAR/Circulation:  Extremity edema, syncope, hypertension, tachycardia, or angina.  GASTROINTESTINAL:  abdominal pain, nausea/emesis, diarrhea, constipation,  hematochezia, or melena.  ENDOCRINE:  Diabetes, steroid use,  thyroid disease or osteoporosis.  MUSCULOSKELETAL: neck pain, back pain, arthralgia, arthritis, or gout.  GENITOURINARY:  frequency, urgency, dysuria, difficulty voiding, hematuria or incontinence.  NEUROLOGIC:  weakness, numbness, paresthesias, seizure, tremor, stroke or memory loss.  PSYCHIATRIC:  depression, anxiety, stress, suicidal thoughts or mood swings.     Physical Exam:  Vitals:    12/04/19 0720   BP: (!) 156/90   Pulse: 52   Resp: 16   Weight: 79.8 kg (176 lb)   Height: 1.753 m (5' 9\")     Exam:  Constitutional: healthy, alert and no distress  Head: normocephalic. " Atraumatic.   Eyes: no redness or jaundice noted   ENT: oropharnx normal.  MMM.    Cardiovascular: no peripheral edema  Respiratory: no audible wheezing  Gastrointestinal: non-distended  : deferred  Skin: no suspicious lesions or rashes  Psychiatric: mentation appears normal and affect normal/bright    Musculoskeletal exam:  Gait/Station/Posture: normal  Patient has able to heel walk and toe walk    Normal AROM of lumbar spine    Myofascial tenderness:  none  SI Joint Tenderness: none  Mirta Sign: neg  + Facet loading: neg b/l  SLR: neg b/l    Neurologic exam:  CN:  Cranial nerves 2-12 are normal  Motor:  5/5 UE and LE strength  Reflexes:     Patella:  R:  3+/4 L: 3+/4   Achilles:  R:  3+/4 L: 3+/4  Other reflexes: Clonus neg at b/l ankles   Sensory:  (lower extremities):   Light touch: normal   Allodynia: absent    Dysethesia: absent    Hyperalgesia: absent     Diagnostic tests:  MRI lumbar spine: 10/14/2019  Findings:  No mass or abnormal signal along the course of the lumbosacral plexus  on either side.  No lower lumbosacral spinal canal or neural foraminal stenosis   Normal marrow signal.                                                          Impression: No abnormality of the lumbosacral plexus on either side.      Testicular US: 5/22/2019  FINDINGS: The right left testicles measure 4.8 x 2.7 x 2.2 cm and 4.8  x 3.5 x 3.1 cm, respectively. Both testicles demonstrate normal,  symmetric echogenicity and blood flow. No testicular mass is seen.  There are small cysts in both epididymides measuring up to 4 mm on the  right and 14 mm on the left. Epididymal blood flow is normal. There is  a benign scrotal leandro on the right. Small bilateral hydroceles. No  varicocele.                                                          IMPRESSION:  1. Normal-appearing testicles.  2. Small bilateral hydroceles.  3. Small epididymal cysts bilaterally.      Personally reviewed imaging no acute findings or degenerative changes  to explain patient's symptoms    Other testing (labs, diagnostics) reviewed:  UC on 6/21/19: no growth  UA on 6/21/19: trace Leuk Esterase  GC and Chlamydia screen negative    Screening tools:  DIRE Score for ongoing opioid management is calculated as follows:    Diagnosis = 1    Intractability = 1    Risk: Psych = 3  Chem Hlth = 3  Reliability = 3  Social = 3    Efficacy = 2    Total DIRE Score = 16 (14 or higher predicts good candidate for ongoing opioid management; 13 or lower predicts poor candidate for opioid management)       Assessment:  1. 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 Started suddenly in March 2019 without inciting event. Burning pain in b/l perineal/penis/scrotal area without radiation. No associated numbness/tingling/weakness. Worse with sitting. Better with movement. Was previously treated for possible infectious etiology with Doxycycline, Keflex, and Bactrim without improvement in pain, workup including scrotal US and Lumbar MRI have been unrevealing. Currently doing pelvic floor PT, hasn't noticed much improvement yet. Has not tried medications yet, patient would like to pursue least invasive options first but would be open to injections/RFA in the future if no improvement.     Plan:  Diagnosis reviewed, treatment option addressed, and risk/benefits discussed.  Self-care instructions given.  I am recommending a multidisciplinary treatment plan to help this patient better manage his pain.      1. Physical Therapy: Continue Pelvic Floor PT  2. Pain Psychologist to address issues of relaxation, behavioral change, coping style, and other factors important to improvement: deferred at this time  3. Diagnostic Studies: none  4. Urine toxicology screen: N/A   5. Medication Management:   1. Will start Gabapentin  100 mg q hs, patient given instructions on how to uptitrate 100 mg at a time with goal of reaching 300 mg TID  2. Discussed Lyrica and Cymbalta as other options if he doesn't get any improvement on the gabapentin  3. Recommended topical OTC lidocaine as another treatment option to try, patient is hesitant as he doesn't think it will be effective but we did discuss it as an option. Could also consider topical diclofenac.  4. We could consider a Baclofen or Valium Rectal Suppository, to help calm down spastic pelvic floor muscles that may be entrapping pudendal nerve, as an option in the future if no improvement with above medications. Patient will call in or send a message via [employer] if he would like to pursue this option.   6. Further procedures recommended: Discussed future options for possible pudendal nerve block with or without progression to RFA if no improvement with medications.   7. Other treatments: Will send for acupuncture  8. Recommendations/follow-up for PCP:  none  9. Release of information: N/A  10. Follow up: 3 months        Patient seen and staffed with Attending Physician: MD Kelli Mendez DO, MBA  [facility] Pain Medicine Fellow    I saw and examined the patient with the Pain Fellow/Resident. I have reviewed and agree with the resident's note and plan of care and made changes and corrections directly to the body of the note.    TIME SPENT:  BY FELLOW/RESIDENT ALONE 25 MIN  BY MYSELF AND FELLOW/RESIDENT TOGETHER 15 MIN      These times included 40 minutes I spent counseling him about his diagnosis and treatment options and coordination of care with the primary team    Delisa Espinoza MD  Pain Medicine, Department of Anesthesiology  , [facility]            Answers for HPI/ROS submitted by the patient on 12/4/2019   SHARON 7 TOTAL SCORE: 2  General Symptoms: No  Skin Symptoms: No  HENT Symptoms: No  EYE SYMPTOMS: No  HEART SYMPTOMS: No  LUNG SYMPTOMS:  No  INTESTINAL SYMPTOMS: No  URINARY SYMPTOMS: No  REPRODUCTIVE SYMPTOMS: No  SKELETAL SYMPTOMS: No  BLOOD SYMPTOMS: No  NERVOUS SYSTEM SYMPTOMS: No  MENTAL HEALTH SYMPTOMS: No      Again, thank you for allowing me to participate in the care of your patient.      Sincerely,    Delisa Espinoza MD

## 2019-12-04 NOTE — NURSING NOTE
LPN reviewed AVS with Pt.  Pt verbalized an understanding of information, and was asked to contact clinic with questions.    Follow up recommended by provider: 6-8 weeks with the provider.     Kiara Perez LPN

## 2019-12-04 NOTE — PROGRESS NOTES
[facility] Consultation    Date of visit: 12/4/2019    Reason for consultation:    Niko Nance is a 34 year old male who is seen in consultation today at the request of his provider, Deonte Ybarra MD.    Primary Care Provider is Tobi Jansen.  Pain medications are being prescribed by N/A.    Please see the [facility] health questionnaire which the patient completed and reviewed with me in detail.    Chief Complaint:    Chief Complaint   Patient presents with     Pain Management     New consult       Pain history:  Niko Nance is a 34 year old male with no significant past medical history who was referred to pain clinic for further evaluation of chronic perineal pain.     Patient reports he first started having problems with pain in Perineum, penis, and scrotal area b/l and posterior proximal right thigh in March 2019. No inciting event but patient does report seeing a chiropractor for a back adjustment a week prior to symptom onset. It is burning in nature, has been constant since onset and unchanged in nature. Worse with sitting, touch, tight fitting clothes. Better with movement and exercise. Denies any urgency, frequency, bladder fullness or spasms, urethral drainage. Denies any redness/swelling. Has not tried any medications. Denies any fevers/chills, N/V, Blood in urine/stool. No changes in sexual activity, no new partners. No loss bowel/bladder control, saddle anesthesia, numbness/tingling/weakness in legs, daily fevers, night sweats.     Treated in Spring 2019 for UTI/urethritis mycoplasma infection, symptoms resolved with doxycycline. But he has had a vague and nondescript discomfort in the scrotal and pelvis area daily since that time, that has not resolved. Despite several different antibiotics- keflex, doxycycline, bactrim- pain has persisted.      He was seen by Urology with no obvious anatomic or infectious or neoplastic process of concern for  continuing pain. MRI lumbar spine was not concerning for radiculopathy or stenosis. Scrotal ultrasound done, showed no concerning findings.  Confirmed ~1cm benign left epididymal head cyst.    Pain rating: intensity ranges from 2/10 to 7/10, and Averages 4/10 on a 0-10 scale.  Aggravating factors include: see above  Relieving factors include: see above  Any bowel or bladder incontinence:     Current pain medications include:  none    Previous medication treatments included:  Ibuprofen- minimal help     Other treatments have included:  Niko Nance has not been seen at a pain clinic in the past.    PT: yes- pelvic PT, recently restarted in November- has only had one session so far with this PT but had 4 sessions with general PT prior.- No change in pain since starting PT   Chiropractor/Manipulation: yes, no change  Massage: no  Acupuncture: no    Yoga/Giovany-Chi: no   Mindfullness/Relaxation Training: no  TENs Unit: no  Injections: no   Spinal Cord Stimulator: no  Surgery: no     Past Medical History:  No past medical history on file.  Patient Active Problem List    Diagnosis Date Noted     Pelvic pain in male 08/09/2019     Priority: Medium     Somatic dysfunction of pelvis region 08/09/2019     Priority: Medium     Elbow pain, left 05/11/2018     Priority: Medium     Triceps tendinitis 05/11/2018     Priority: Medium     Chronic left shoulder pain 04/26/2017     Priority: Medium     Aftercare following surgery of the musculoskeletal system 12/19/2014     Priority: Medium     Breast tenderness in male 01/20/2014     Priority: Medium     History of knee problem 01/20/2014     Priority: Medium     Knee pain, left 01/20/2014     Priority: Medium       Past Surgical History:  No past surgical history on file.   L knee reconstruction and scope  Medications:  Current Outpatient Medications   Medication Sig Dispense Refill     finasteride (PROPECIA) 1 MG tablet Take 1 mg by mouth     for hair loss    Allergies:   No Known  "Allergies     Social History:  Home situation: Lives in [place], alone  Occupation/Schooling: working-   Tobacco use: none  Alcohol use: yes- 2 days/week  Drug use: none  History of chemical dependency treatment: none  History of abuse:   - sexual- none   - physical-none   - emotional- none    Family history:  No family history on file.    Review of Systems:    POSTIVE IN BOLD  GENERAL: fever/chills, fatigue, general unwell feeling, weight gain/loss.  HEAD/EYES:  headache, dizziness, or vision changes.    EARS/NOSE/THROAT:  Nosebleeds, hearing loss, sinus infection, earache, tinnitus.  IMMUNE:  Allergies, cancer, immune deficiency, or infections.  SKIN:  Urticaria, rash, hives  HEME/Lymphatic:   anemia, easy bruising, easy bleeding.  RESPIRATORY:  cough, wheezing, or shortness of breath  CARDIOVASCULAR/Circulation:  Extremity edema, syncope, hypertension, tachycardia, or angina.  GASTROINTESTINAL:  abdominal pain, nausea/emesis, diarrhea, constipation,  hematochezia, or melena.  ENDOCRINE:  Diabetes, steroid use,  thyroid disease or osteoporosis.  MUSCULOSKELETAL: neck pain, back pain, arthralgia, arthritis, or gout.  GENITOURINARY:  frequency, urgency, dysuria, difficulty voiding, hematuria or incontinence.  NEUROLOGIC:  weakness, numbness, paresthesias, seizure, tremor, stroke or memory loss.  PSYCHIATRIC:  depression, anxiety, stress, suicidal thoughts or mood swings.     Physical Exam:  Vitals:    12/04/19 0720   BP: (!) 156/90   Pulse: 52   Resp: 16   Weight: 79.8 kg (176 lb)   Height: 1.753 m (5' 9\")     Exam:  Constitutional: healthy, alert and no distress  Head: normocephalic. Atraumatic.   Eyes: no redness or jaundice noted   ENT: oropharnx normal.  MMM.    Cardiovascular: no peripheral edema  Respiratory: no audible wheezing  Gastrointestinal: non-distended  : deferred  Skin: no suspicious lesions or rashes  Psychiatric: mentation appears normal and affect " normal/bright    Musculoskeletal exam:  Gait/Station/Posture: normal  Patient has able to heel walk and toe walk    Normal AROM of lumbar spine    Myofascial tenderness:  none  SI Joint Tenderness: none  Mirta Sign: neg  + Facet loading: neg b/l  SLR: neg b/l    Neurologic exam:  CN:  Cranial nerves 2-12 are normal  Motor:  5/5 UE and LE strength  Reflexes:     Patella:  R:  3+/4 L: 3+/4   Achilles:  R:  3+/4 L: 3+/4  Other reflexes: Clonus neg at b/l ankles   Sensory:  (lower extremities):   Light touch: normal   Allodynia: absent    Dysethesia: absent    Hyperalgesia: absent     Diagnostic tests:  MRI lumbar spine: 10/14/2019  Findings:  No mass or abnormal signal along the course of the lumbosacral plexus  on either side.  No lower lumbosacral spinal canal or neural foraminal stenosis   Normal marrow signal.                                                          Impression: No abnormality of the lumbosacral plexus on either side.      Testicular US: 5/22/2019  FINDINGS: The right left testicles measure 4.8 x 2.7 x 2.2 cm and 4.8  x 3.5 x 3.1 cm, respectively. Both testicles demonstrate normal,  symmetric echogenicity and blood flow. No testicular mass is seen.  There are small cysts in both epididymides measuring up to 4 mm on the  right and 14 mm on the left. Epididymal blood flow is normal. There is  a benign scrotal leandro on the right. Small bilateral hydroceles. No  varicocele.                                                          IMPRESSION:  1. Normal-appearing testicles.  2. Small bilateral hydroceles.  3. Small epididymal cysts bilaterally.      Personally reviewed imaging no acute findings or degenerative changes to explain patient's symptoms    Other testing (labs, diagnostics) reviewed:  UC on 6/21/19: no growth  UA on 6/21/19: trace Leuk Esterase  GC and Chlamydia screen negative    Screening tools:  DIRE Score for ongoing opioid management is calculated as follows:    Diagnosis =  1    Intractability = 1    Risk: Psych = 3  Chem Hlth = 3  Reliability = 3  Social = 3    Efficacy = 2    Total DIRE Score = 16 (14 or higher predicts good candidate for ongoing opioid management; 13 or lower predicts poor candidate for opioid management)       Assessment:  1. 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 Started suddenly in March 2019 without inciting event. Burning pain in b/l perineal/penis/scrotal area without radiation. No associated numbness/tingling/weakness. Worse with sitting. Better with movement. Was previously treated for possible infectious etiology with Doxycycline, Keflex, and Bactrim without improvement in pain, workup including scrotal US and Lumbar MRI have been unrevealing. Currently doing pelvic floor PT, hasn't noticed much improvement yet. Has not tried medications yet, patient would like to pursue least invasive options first but would be open to injections/RFA in the future if no improvement.     Plan:  Diagnosis reviewed, treatment option addressed, and risk/benefits discussed.  Self-care instructions given.  I am recommending a multidisciplinary treatment plan to help this patient better manage his pain.      1. Physical Therapy: Continue Pelvic Floor PT  2. Pain Psychologist to address issues of relaxation, behavioral change, coping style, and other factors important to improvement: deferred at this time  3. Diagnostic Studies: none  4. Urine toxicology screen: N/A   5. Medication Management:   1. Will start Gabapentin 100 mg q hs, patient given instructions on how to uptitrate 100 mg at a time with goal of reaching 300 mg TID  2. Discussed Lyrica and Cymbalta as other options if he doesn't get any improvement on the gabapentin  3. Recommended topical OTC lidocaine as another treatment  option to try, patient is hesitant as he doesn't think it will be effective but we did discuss it as an option. Could also consider topical diclofenac.  4. We could consider a Baclofen or Valium Rectal Suppository, to help calm down spastic pelvic floor muscles that may be entrapping pudendal nerve, as an option in the future if no improvement with above medications. Patient will call in or send a message via [employer] if he would like to pursue this option.   6. Further procedures recommended: Discussed future options for possible pudendal nerve block with or without progression to RFA if no improvement with medications.   7. Other treatments: Will send for acupuncture  8. Recommendations/follow-up for PCP:  none  9. Release of information: N/A  10. Follow up: 3 months        Patient seen and staffed with Attending Physician: MD Kelli Mendez DO, MBA  [facility] Pain Medicine Fellow    I saw and examined the patient with the Pain Fellow/Resident. I have reviewed and agree with the resident's note and plan of care and made changes and corrections directly to the body of the note.    TIME SPENT:  BY FELLOW/RESIDENT ALONE 25 MIN  BY MYSELF AND FELLOW/RESIDENT TOGETHER 15 MIN      These times included 40 minutes I spent counseling him about his diagnosis and treatment options and coordination of care with the primary team    Delisa Espinoza MD  Pain Medicine, Department of Anesthesiology  , [facility]            Answers for HPI/ROS submitted by the patient on 12/4/2019   SHARON 7 TOTAL SCORE: 2  General Symptoms: No  Skin Symptoms: No  HENT Symptoms: No  EYE SYMPTOMS: No  HEART SYMPTOMS: No  LUNG SYMPTOMS: No  INTESTINAL SYMPTOMS: No  URINARY SYMPTOMS: No  REPRODUCTIVE SYMPTOMS: No  SKELETAL SYMPTOMS: No  BLOOD SYMPTOMS: No  NERVOUS SYSTEM SYMPTOMS: No  MENTAL HEALTH SYMPTOMS: No

## 2019-12-05 ASSESSMENT — ANXIETY QUESTIONNAIRES: GAD7 TOTAL SCORE: 2

## 2019-12-10 ENCOUNTER — THERAPY VISIT (OUTPATIENT)
Dept: CHIROPRACTIC MEDICINE | Facility: CLINIC | Age: 34
End: 2019-12-10
Attending: ANESTHESIOLOGY
Payer: COMMERCIAL

## 2019-12-10 DIAGNOSIS — M99.03 SOMATIC DYSFUNCTION OF LUMBAR REGION: ICD-10-CM

## 2019-12-10 DIAGNOSIS — M62.838 SPASM OF MUSCLE: ICD-10-CM

## 2019-12-10 DIAGNOSIS — M99.05 SOMATIC DYSFUNCTION OF PELVIS REGION: Primary | ICD-10-CM

## 2019-12-10 DIAGNOSIS — R10.2 PELVIC PAIN IN MALE: ICD-10-CM

## 2019-12-10 PROCEDURE — 98940 CHIROPRACT MANJ 1-2 REGIONS: CPT | Mod: AT | Performed by: CHIROPRACTOR

## 2019-12-10 PROCEDURE — 99203 OFFICE O/P NEW LOW 30 MIN: CPT | Mod: 25 | Performed by: CHIROPRACTOR

## 2019-12-10 PROCEDURE — 97810 ACUP 1/> WO ESTIM 1ST 15 MIN: CPT | Performed by: CHIROPRACTOR

## 2019-12-10 NOTE — PROGRESS NOTES
"Initial Chiropractic Clinic Visit    PCP: Tobi Jansen    Niko Nance is a 34 year old male who is seen  in consultation at the request of  Delisa Espinoza M.D. presenting with pelvic pain and bruning . Patient reports that the onset was about 9 months ago insidiously. When asked, patient denies:, falling, slipping, bending and reaching or sleeping awkwardly. Niko rates his symptoms at 4 to 5 out of 10. He has not had this prior.  He denies any low back pain and his sleep is      Injury:     Location of Pain: pelvis/groin  Duration of Pain: 9 month(s)  Rating of Pain at worst: 5/10  Rating of Pain Currently: 4/10  Symptoms are better with: physical activity  Symptoms are worse with: sitting  Additional Features:      Health History  as reported by the patient:    How does the patient rate their own health:   Excellent    Current or past medical history:   No red flags identified    Medical allergies  None    Past Traumas/Surgeries  Orthopedic: knee x4    Family History  The family history is not on file.    Medications:  Pain    Occupation:      Primary job tasks:   Computer work    Barriers as home/work:   none    Additional health Issues:         Review of Systems  Musculoskeletal: as above  Remainder of review of systems is negative including constitutional, CV, pulmonary, GI, Skin and Neurologic except as noted in HPI or medical history.    No past medical history on file.  No past surgical history on file.  Objective  There were no vitals taken for this visit.      GENERAL APPEARANCE: healthy, alert and no distress   GAIT: NORMAL  SKIN: no suspicious lesions or rashes  NEURO: Normal strength and tone, mentation intact and speech normal  PSYCH:  mentation appears normal and affect normal/bright    Low back exam:    Inspection:  \"     no visible deformity in the low back       normal skin\",    ROM:       full flexion       full extension    Tender:       paraspinal muscles    Non " Tender:       remainder of lumbar spine    Strength:       hip flexion 5/5 Normal       knee extension 5/5 Normal       ankle dorsiflexion 5/5 Normal       ankle plantarflexion 5/5 Normal       dorsiflexion of the great toe 5/5 Normal    Reflexes:       patellar (L3, L4) 2 bilaterally    Sensation:      grossly intact throughout lower extremities    Special tests:  SLR - Right negative and Left negative, Fabere - Right negative and Left negative, Yeoman's - Right negative and Left negative, Pablo - Right negative and Left negative and Ely's - Right negative and Left negative    Segmental spinal dysfunction/restrictions found at:  L4 , L5  and PSIS Right     The following soft tissue hypotonicities were observed:Piriformis: right, referred pain: no    Trigger points were found in:Piriformis    Muscle spasm found in:Piriformis      Radiology:      Assessment:    1. Somatic dysfunction of pelvis region    2. Pelvic pain in male    3. Somatic dysfunction of lumbar region    4. Spasm of muscle        RX ordered/plan of care  Anticipated outcomes  Possible risks and side effects    After discussing the risk and benefits of care, patient consented to treatment    Prognosis: Guarded      Patient's condition:  Patient had restrictions pre-manipulation    Treatment effectiveness:  Post manipulation there is better intersegmental movement and Patient claims to feel looser post manipulation      Plan:    Procedures:  Evaluation and Management:  56709 Moderate level exam 30 min    CMT:  28051 Chiropractic manipulative treatment 1-2 regions performed   Lumbar: Activator, L4, L5, Prone  Pelvis: Drop Table, PSIS Right , Prone    Modalities:  78883: Acupuncture, for 15 minutes:  Points: B25, B27, GV3, K3, B62, SI3  Ahsi point in pirifpormis  For 15 minutes    Therapeutic procedures:  None    Response to Treatment  No Change in symptoms       Treatment plan and goals:  Goals:  SITTING: the patient specific goal is to attain  pre-injury status of  7 hours comfortably    Frequency of care  Duration of care is estimated to be 6 weeks, from the initial treatment.  It is estimated that the patient will need a total of 6 visits to resolve this episode.  For the initial therapeutic trial of care, the frequency is recommended at 1 X week, once daily.  A reevaluation would be clinically appropriate in 6 visits, to determine progress and further course of care.    In-Office Treatment  Evaluation  Spinal Chiropractic Manipulative Therapy:    Acupuncture:          Recommendations:    Instructions:  ice 20 minutes every other hour as needed    Follow-up:    Return to care in one week.       Discussed the assessment with the patient.      Disclaimer: This note consists of symbols derived from keyboarding, dictation and/or voice recognition software. As a result, there may be errors in the script that have gone undetected. Please consider this when interpreting information found in this chart.

## 2019-12-11 ENCOUNTER — THERAPY VISIT (OUTPATIENT)
Dept: PHYSICAL THERAPY | Facility: CLINIC | Age: 34
End: 2019-12-11
Payer: COMMERCIAL

## 2019-12-11 DIAGNOSIS — R10.2 PELVIC PAIN IN MALE: ICD-10-CM

## 2019-12-11 DIAGNOSIS — M99.05 SOMATIC DYSFUNCTION OF PELVIS REGION: ICD-10-CM

## 2019-12-11 PROCEDURE — 97140 MANUAL THERAPY 1/> REGIONS: CPT | Mod: GP | Performed by: PHYSICAL THERAPIST

## 2019-12-11 PROCEDURE — 97535 SELF CARE MNGMENT TRAINING: CPT | Mod: GP | Performed by: PHYSICAL THERAPIST

## 2019-12-11 NOTE — PROGRESS NOTES
Subjective:  HPI                    Objective:  System    Physical Exam    General     ROS    Assessment/Plan:    PROGRESS  REPORT    Progress reporting period is from 11/25/2019 to 12/11/2019.       SUBJECTIVE  Subjective changes noted by patient:  .  Subjective: A little longer before pain came on at a meeting today.  Pain was 4/10 over the weekend.  In the morning feels better.  Sometimes pain is at a zero.  Had one treatment with manipulation and acupuncture      Current pain level is 4/10  .     Previous pain level was   Initial Pain level: 4/10.   Changes in function:  Yes (See Goal flowsheet attached for changes in current functional level)  Adverse reaction to treatment or activity: None    OBJECTIVE  Changes noted in objective findings:  Yes,   Objective: Internal rectal evalauation today.  Practiced contract, relax and bearing down with internal palpation feedback.  No significant provation of symptoms with palation locally over the levator ani or right obturator internus.  Tried gently stretch and massage to this area.  Discussed that if internal work seems to help pain that patient can try internal massage tool.  At this point we discussed having patient try 4-6 chiropractic manipulation and acupuncture visits to see if this helps his pain.       ASSESSMENT/PLAN  Updated problem list and treatment plan: Diagnosis 1:  Pelvic pain  Pain -  manual therapy  Decreased ROM/flexibility - manual therapy and therapeutic exercise  Decreased joint mobility - manual therapy, therapeutic exercise and home program  Impaired muscle performance - biofeedback, neuro re-education and home program  Decreased function - therapeutic activities and home program  STG/LTGs have been met or progress has been made towards goals:  Yes (See Goal flow sheet completed today.)  Assessment of Progress: The patient's condition has potential to improve.  The patient's progress has slowed.  Self Management Plans:  Patient has been  instructed in a home treatment program.  I have re-evaluated this patient and find that the nature, scope, duration and intensity of the therapy is appropriate for the medical condition of the patient.  Niko continues to require the following intervention to meet STG and LTG's:  Chiropractic    Recommendations:  This patient would benefit from further evaluation.    Please refer to the daily flowsheet for treatment today, total treatment time and time spent performing 1:1 timed codes.

## 2019-12-17 ENCOUNTER — THERAPY VISIT (OUTPATIENT)
Dept: CHIROPRACTIC MEDICINE | Facility: CLINIC | Age: 34
End: 2019-12-17
Payer: COMMERCIAL

## 2019-12-17 DIAGNOSIS — M99.03 SOMATIC DYSFUNCTION OF LUMBAR REGION: ICD-10-CM

## 2019-12-17 DIAGNOSIS — M99.05 SEGMENTAL DYSFUNCTION OF PELVIC REGION: Primary | ICD-10-CM

## 2019-12-17 DIAGNOSIS — M62.838 SPASM OF MUSCLE: ICD-10-CM

## 2019-12-17 DIAGNOSIS — R10.2 PELVIC PAIN IN MALE: ICD-10-CM

## 2019-12-17 PROCEDURE — 98940 CHIROPRACT MANJ 1-2 REGIONS: CPT | Mod: AT | Performed by: CHIROPRACTOR

## 2019-12-17 PROCEDURE — 97810 ACUP 1/> WO ESTIM 1ST 15 MIN: CPT | Performed by: CHIROPRACTOR

## 2019-12-17 NOTE — PROGRESS NOTES
Visit #:  2    Subjective:  Niko Nance is a 34 year old male who is seen in f/u up for:        Segmental dysfunction of pelvic region  Pelvic pain in male  Somatic dysfunction of lumbar region  Spasm of muscle.     Since last visit on 12/10/2019,  Niko Nance reports:    Area of chief complaint:  Lumbar :  Symptoms are graded at 4/10. The quality is described as stiff, achey, sharp.  Motion has remained about the same, no improvement. Patient feels that they have not improved and feel the same. Niko reports that he is feeling about the same.  He had the same pelvic pain intensity and frequency since last visit           Objective:  The following was observed:    P: palpatory tenderness Piriformis   A: static palpation demonstrates intersegmental asymmetry , lumbar, pelvis  R: motion palpation notes restricted motion, L4 , L5  and PSIS Right   T: hypertonicity at: Piriformis     Segmental spinal dysfunction/restrictions found at:  L4 , L5  and PSIS Right       Assessment:    Diagnoses:      1. Segmental dysfunction of pelvic region    2. Pelvic pain in male    3. Somatic dysfunction of lumbar region    4. Spasm of muscle        Patient's condition:  Patient had restrictions pre-manipulation    Treatment effectiveness:  Post manipulation there is better intersegmental movement and Patient claims to feel looser post manipulation      Procedures:  CMT:  88577 Chiropractic manipulative treatment 1-2 regions performed   Lumbar: Activator, L4, L5, Prone  Pelvis: Drop Table, PSIS Right , Prone    Modalities:  91407: Acupuncture, for 15 minutes:  Points: B25, B27, GV3, K3, B62, SI3  BL33  For 15 minutes    Therapeutic procedures:  None    Response to Treatment  No Change in symptoms     Prognosis: Good    Progress towards Goals: Patient has not made progress towards goal.     Recommendations:    Instructions:  do not change activity level    Follow-up:    Return to care in one week.

## 2020-01-15 ENCOUNTER — OFFICE VISIT (OUTPATIENT)
Dept: ANESTHESIOLOGY | Facility: CLINIC | Age: 35
End: 2020-01-15
Payer: COMMERCIAL

## 2020-01-15 VITALS — RESPIRATION RATE: 16 BRPM | HEIGHT: 69 IN | BODY MASS INDEX: 26.81 KG/M2 | WEIGHT: 181 LBS

## 2020-01-15 DIAGNOSIS — G58.8 PUDENDAL NEURALGIA: Primary | ICD-10-CM

## 2020-01-15 PROBLEM — M99.05 SOMATIC DYSFUNCTION OF PELVIS REGION: Status: RESOLVED | Noted: 2019-08-09 | Resolved: 2020-01-15

## 2020-01-15 PROBLEM — R10.2 PELVIC PAIN IN MALE: Status: RESOLVED | Noted: 2019-08-09 | Resolved: 2020-01-15

## 2020-01-15 RX ORDER — DIAZEPAM 2.5 MG/.5ML
5 GEL RECTAL
Qty: 30 SUPPOSITORY | Refills: 1 | Status: CANCELLED | OUTPATIENT
Start: 2020-01-15

## 2020-01-15 ASSESSMENT — MIFFLIN-ST. JEOR: SCORE: 1761.39

## 2020-01-15 ASSESSMENT — PAIN SCALES - GENERAL: PAINLEVEL: MILD PAIN (3)

## 2020-01-15 NOTE — NURSING NOTE
LPN reviewed AVS with Pt.  Pt verbalized an understanding of information, and was asked to contact clinic with questions.    Follow up recommended by provider: 4-6 weeks after injection.         Kiara Perez LPN

## 2020-01-15 NOTE — LETTER
RE: Niko Nance  [address]  [facility] [address]     Dear Colleague,    Thank you for referring your patient, Niko Nance, to the [facility] at [facility]. Please see a copy of my visit note below.    [facility]    Date of visit: 1/15/2020    Chief complaint: No chief complaint on file.    Interval history:  Niko Nance was last seen by me on 12/4/2019.      Recommendations/plan at the last visit included:  1. Physical Therapy: Continue Pelvic Floor PT  2. Medication Management:   1. Will start Gabapentin 100 mg q hs, patient given instructions on how to uptitrate 100 mg at a time with goal of reaching 300 mg TID  2. Discussed Lyrica and Cymbalta as other options if he doesn't get any improvement on the gabapentin  3. Recommended topical OTC lidocaine as another treatment option to try, patient is hesitant as he doesn't think it will be effective but we did discuss it as an option. Could also consider topical diclofenac.  4. We could consider a Baclofen or Valium Rectal Suppository, to help calm down spastic pelvic floor muscles that may be entrapping pudendal nerve, as an option in the future if no improvement with above medications. Patient will call in or send a message via [facility] if he would like to pursue this option.   3. Further procedures recommended: Discussed future options for possible pudendal nerve block with or without progression to RFA if no improvement with medications.   4. Other treatments: Will send for acupuncture    Since his last visit, Niko Nance reports:  No change in pain  PT not beneficial  Gabapentin 300 mg TID   Hasn't tried topical lidocaine  Pain is unchanged     Pain scores:  Pain intensity on average is 4 on a scale of 0-10.     Current pain treatments:   Gabapentin 300 mg    Past pain treatments:  Ibuprofen   Abx    Side Effects: no side effect    Medications:  Current  "Outpatient Medications   Medication Sig Dispense Refill     finasteride (PROPECIA) 1 MG tablet Take 1 mg by mouth       gabapentin (NEURONTIN) 100 MG capsule Take 3 capsules (300 mg) by mouth 3 times daily 270 capsule 1       Medical History: any changes in medical history since they were last seen? No    Review of Systems:  The 14 system ROS was reviewed from the intake questionnaire, and is positive for: bilateral testicular pain   Any bowel or bladder problems: denies  Mood: stable    Physical Exam:  Resp. rate 16, height 1.753 m (5' 9\"), weight 82.1 kg (181 lb).  General: AAOx3, NAD  Gait: Normal  MSK exam: deferred for conversation    Assessment:   1. 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 He has tried low dose gabapentin (100 mg TID) without significant relief, and continues to experience sharp pain.  At this time he would like to avoid any further medications, and wants to stop taking gabapentin. He does not want to pursue other options such as muscle relaxers in suppository form. He did express interest in pudendal nerve block.      Plan:  1. Physical Therapy:  Continue pelvic floor PT  2. Clinical Health Psychologist to address issues of relaxation, behavioral change, coping style, and other factors important to improvement.  Not indicated  3. Diagnostic Studies:  none  4. Medication Management:  May continue gabapentin or wean off; will think about options and decide accordingly  5. Further procedures recommended: Pudendal nerve block  6. Recommendations to PCP: none  7. Follow up: 6 weeks after procedure.     Delisa Espinoza MD    Pain Medicine  Department of Anesthesiology  [facility]      "

## 2020-01-15 NOTE — PATIENT INSTRUCTIONS
Medications:    If you are wanting to ween off of your Gabapentin- We recommend you follow the instructions below.     Current dose 300 mg, three times daily. (Morning, Afternoon, Bedtime)    Start by decreasing to 300 mg two times daily (Morning, and Bedtime) for 2-3 days.     Then decrease to 300 mg, One time daily. (Bedtime) for 2-3 days.     Then off.       Procedures:    Pudendal Nerve Block ordered with Dr. Crisostomo. Please call to schedule at your convenience. Please let our office know if you have additional questions.       Recommended Follow up:  4-6 Weeks after injection.         When calling to schedule your procedure appointment, also make your follow up clinic appointment 4-6 weeks after the procedure.    Please call [phone] to schedule, reschedule, or cancel your procedure appointment.   Phones are answered Monday - Friday from 7:30 - 4:00pm.  Leave a voicemail with your name, birth date, and phone number if no one is available to take your call.     Your procedure: Pudendal Nerve Block, with Dr. Crisostomo     On the day of the procedure  1. Arrive 1 hour earlier than your scheduled time, to the [facility]  Address: [address] [place] [id number]  2. Check in on the 5th floor for your procedure    If you must reschedule your procedure more than two times, you must follow up in clinic before rescheduling again.    Preparing for your procedure    CAUTION - FAILURE TO FOLLOW THESE PRE-PROCEDURE INSTRUCTIONS WILL RESULT IN YOUR PROCEDURE BEING RESCHEDULED.            You must have a  take you home after your procedure. Transportation by taxi or para-transit is okay as long as you have a responsible adult accompany you. You must provide your 's full name and contact number at time of check in.     Fasting Protocol You may have NOTHING SOLID TO EAT 8 HOURS prior to arrival at the procedure area.     You may have CLEAR LIQUIDS UP TO 2 HOURS prior to  arrival.    Broth and candy are considered solid food and require an eight hour fast.     Clear liquids include water, clear fruit juice (no pulp), carbonated beverages, ice, black coffee, black tea, clear jello. No alcohol containing beverages.   Medications If you take any medications, DO NOT STOP. Take your medications as usual the day of your procedure with a sip of water AT LEAST 2 HOURS PRIOR TO ARRIVAL.    Antibiotics If you are currently taking antibiotics, you must complete the entire dose 7 days prior to your scheduled procedure. You must be clear of any signs or symptoms of infection. If you begin antibiotics, please contact our clinic for instructions.     Fever, Chills, or Rash If you experience a fever of higher than 100 degrees, chills, rash, or open wounds during the one week before your procedure, please call the clinic to see if you may proceed with your procedure.      Medication Hold List  **Patients under Cardiology/Neurology care should consult their provider prior to the pain procedure to verify pre-procedure medication instructions. The information below contains general guidelines.**    Blood Thinners If you are taking daily ASPIRIN, PLAVIX, OR OTHER BLOOD THINNERS SUCH AS COUMADIN/WARFARIN, we will need your prescribing doctor to sign a release permitting you to stop these medications. Once approved by your prescribing doctor - STOP ALL BLOOD THINNERS BASED ON THE TIME TABLE BELOW PRIOR TO YOUR PROCEDURE. If you have been instructed to stop WARFARIN(COUMADIN), you must have an INR lab drawn the day before your procedure. . Your INR must be within normal limits before we can perform your injection. MEDICATIONS CAN BE RESTARTED AFTER YOUR PROCEDURE.    14 DAY HOLD  Ticlid (ticlopidine)    10 DAY HOLD  Effient (Prasugel)    3 DAY HOLD  Xarelto (rivaroxaban) 7 DAY HOLD  Anacin, Bufferin, Ecotrin, Excedrin, Aggrenox (Aspirin)  Brilinta (ticagrelor)  Coumadin (Warfarin)  Pradexa  (Dabigatran)  Elmiron (Pentosan)  Plavix (Clopidogrel Bisulfate)  Pletal (Cilostazol)    24 HOUR HOLD  Lovenox (enoxaparin)  Agrylin (Anagrelide)        Non-steroidal Anti-inflammatories (NSAIDs) DO NOT TAKE any non-steroidal anti-inflammatory medications (NSAIDs) listed on the table below. MEDICATIONS CAN BE RESTARTED AFTER YOUR PROCEDURE. Celebrex is OK to take and does not need to be discontinued.     Medications to stop:  3 DAY HOLD  Advil, Motrin (Ibuprofen)  Arthrotec (diciofenac sodium/misoprostol)  Clinoril (Sulindac)  Indocin (Indomethacin)  Lodine (Etodolac)  Toradol (Ketorolac)  Vicoprofen (Hydrocodone and Ibuprofen)  Voltaren (Diclotenac)    14 DAY HOLD  Daypro (Oxaprozin)  Feldene (Piroxicam)   7 DAY HOLD  Aleve (Naproxen sodium)  Darvon compound (contains aspirin)  Naprosyn (Naproxen)  Norgesic Forte (contains aspirin)  Mobic (Meloxicam)  Oruvall (Ketoprofen)  Percodan (contains aspirin)  Relafen (Nabumetone)  Salsalate  Trilisate  Vitamin E (more than 400 mg per day)  Any medication containing aspirin                To speak with a nurse, schedule/reschedule/cancel a clinic appointment, or request a medication refill call: [phone]     You can also reach us by [employer]: [email or web address]

## 2020-01-15 NOTE — PROGRESS NOTES
[facility]    Date of visit: 1/15/2020    Chief complaint: No chief complaint on file.      Interval history:  Niko Nance was last seen by me on 12/4/2019.      Recommendations/plan at the last visit included:  1. Physical Therapy: Continue Pelvic Floor PT  2. Medication Management:   1. Will start Gabapentin 100 mg q hs, patient given instructions on how to uptitrate 100 mg at a time with goal of reaching 300 mg TID  2. Discussed Lyrica and Cymbalta as other options if he doesn't get any improvement on the gabapentin  3. Recommended topical OTC lidocaine as another treatment option to try, patient is hesitant as he doesn't think it will be effective but we did discuss it as an option. Could also consider topical diclofenac.  4. We could consider a Baclofen or Valium Rectal Suppository, to help calm down spastic pelvic floor muscles that may be entrapping pudendal nerve, as an option in the future if no improvement with above medications. Patient will call in or send a message via [employer] if he would like to pursue this option.   3. Further procedures recommended: Discussed future options for possible pudendal nerve block with or without progression to RFA if no improvement with medications.   4. Other treatments: Will send for acupuncture    Since his last visit, Niko Nance reports:  No change in pain  PT not beneficial  Gabapentin 300 mg TID   Hasn't tried topical lidocaine  Pain is unchanged     Pain scores:  Pain intensity on average is 4 on a scale of 0-10.     Current pain treatments:   Gabapentin 300 mg    Past pain treatments:  Ibuprofen   Abx    Side Effects: no side effect    Medications:  Current Outpatient Medications   Medication Sig Dispense Refill     finasteride (PROPECIA) 1 MG tablet Take 1 mg by mouth       gabapentin (NEURONTIN) 100 MG capsule Take 3 capsules (300 mg) by mouth 3 times daily 270 capsule 1       Medical History: any changes in medical history since they were  "last seen? No    Review of Systems:  The 14 system ROS was reviewed from the intake questionnaire, and is positive for: bilateral testicular pain   Any bowel or bladder problems: denies  Mood: stable    Physical Exam:  Resp. rate 16, height 1.753 m (5' 9\"), weight 82.1 kg (181 lb).  General: AAOx3, NAD  Gait: Normal  MSK exam: deferred for conversation    Assessment:   1. 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 He has tried low dose gabapentin (100 mg TID) without significant relief, and continues to experience sharp pain.  At this time he would like to avoid any further medications, and wants to stop taking gabapentin. He does not want to pursue other options such as muscle relaxers in suppository form. He did express interest in pudendal nerve block.      Plan:  1. Physical Therapy:  Continue pelvic floor PT  2. Clinical Health Psychologist to address issues of relaxation, behavioral change, coping style, and other factors important to improvement.  Not indicated  3. Diagnostic Studies:  none  4. Medication Management:  May continue gabapentin or wean off; will think about options and decide accordingly  5. Further procedures recommended: Pudendal nerve block  6. Recommendations to PCP: none  7. Follow up: 6 weeks after procedure.       Delisa Espinoza MD    Pain Medicine  Department of Anesthesiology  [facility]    "

## 2020-08-31 ENCOUNTER — OFFICE VISIT (OUTPATIENT)
Dept: OTOLARYNGOLOGY | Facility: CLINIC | Age: 35
End: 2020-08-31
Payer: COMMERCIAL

## 2020-08-31 VITALS — HEART RATE: 67 BPM | SYSTOLIC BLOOD PRESSURE: 158 MMHG | OXYGEN SATURATION: 98 % | DIASTOLIC BLOOD PRESSURE: 88 MMHG

## 2020-08-31 DIAGNOSIS — R09.A2 GLOBUS PHARYNGEUS: Primary | ICD-10-CM

## 2020-08-31 DIAGNOSIS — H90.41 SENSORINEURAL HEARING LOSS (SNHL) OF RIGHT EAR WITH UNRESTRICTED HEARING OF LEFT EAR: ICD-10-CM

## 2020-08-31 PROCEDURE — 31575 DIAGNOSTIC LARYNGOSCOPY: CPT | Performed by: OTOLARYNGOLOGY

## 2020-08-31 PROCEDURE — 99203 OFFICE O/P NEW LOW 30 MIN: CPT | Mod: 25 | Performed by: OTOLARYNGOLOGY

## 2020-08-31 ASSESSMENT — PAIN SCALES - GENERAL: PAINLEVEL: MILD PAIN (3)

## 2020-08-31 NOTE — LETTER
2020         RE: Niko Nance  [address]  [facility] [address]        Dear Colleague,    Thank you for referring your patient, Niko Nance, to the [facility]. Please see a copy of my visit note below.    Otolaryngology Adult Consultation    Patient: Niko Nance  : 1985        HPI:  Niko Nance is a 35 year old male seen today in the Otolaryngology Clinic for possible fish bone in throat.  Patient reports that around  he felt like he had a fishbone stuck in his throat.  It did eventually pass he reports but since then he has been left with right-sided residual throat discomfort.  Feels like it is directly behind his tongue.  He has been looking himself but he has not been able to see anything.  He does have a history of right-sided hearing loss.  He does feel like his right ear also feels different to him. And he has been having headaches (frontal) associated with this.      Medications:  Current Outpatient Rx   Medication Sig Dispense Refill     gabapentin (NEURONTIN) 100 MG capsule Take 3 capsules (300 mg) by mouth 3 times daily 270 capsule 1     finasteride (PROPECIA) 1 MG tablet Take 1 mg by mouth         Allergies: Patient has no known allergies.     PMH:  No past medical history on file.    PSH:  No past surgical history on file.    FH:  No family history on file.     SH:  Social History     Tobacco Use     Smoking status: Never Smoker     Smokeless tobacco: Never Used   Substance Use Topics     Alcohol use: Not on file     Drug use: Not on file       Review of Systems  No flowsheet data found.    Physical Exam:    GEN:  The patient is alert, oriented and in no acute distress.  HEAD:  Head, face scalp is grossly normal.  EARS:  Ears demonstrate normal canals, auricles and tympanic membranes.  NOSE:  External nose is straight, skin is normal.                Intranasal exam (anterior rhinoscopy) reveals normal moist mucosa, turbinate                  tissue  without edema, erythema or crusting.  Septum with a left-sided septal spur.    ORAL:  Oral cavity shows healthy mucosa with out ulceration, masses or other lesions                involving the tongue, palate, buccal mucosa, floor of mouth or gingiva.  No foreign bodies are seen.  NECK:   Neck is without adenopathy, thyroid or salivary gland masses.  PUL: normal work of breathing; no stridor  CV: RRR; no peripheral edema  M/S: normal muscle tone     Laryngoscopy is performed to examine the upper airway for pathology, functional or anatomic abnormality.  After application of topical anesthetic/decongestant solution the flexible endoscope was passed into each nasal cavity separately.  Findings are as follows:   Nasal passages without abnormality.     Nasopharynx:  Clear, no lesions, crusting or inflammation   Base of tongue, vallecula, epiglottis, aryepiglottic folds, false vocal folds without mucosal lesions, masses or anatomic abnormality.   Glottis with normal movement of vocal folds, normal mucosa and no lesions   Pyriform sinuses, post-cricoid area, and posterior pharyngeal wall without lesions.   There is mild edema in the postcricoid area.    I reviewed an audiogram from January 9, 2013 which showed is a significant mid to high-frequency sensorineural hearing loss on the right side.  Normal hearing on the left side.    Assessment/Plan: Patient presents for evaluation of possible foreign body in his throat.  Though his main complaint is right ear ache and headache that he has been having since that event.  I reassured him that I did not see any evidence of fishbone or other foreign body in his throat.  There is no ulceration or masses seen.  He does have a little bit of post cricoid edema which might be suggestive of reflux.  Patient does deal with reflex.  It might be helpful for him to increase or be more consistent with his reflux medications for the next week or 2.  The ear discomfort might also be referred  pain from his throat.  I think the headaches are more tension related.  At this time I would recommend observation.  As for his right ear hearing loss, he wonders if there is anything that can be done about it currently.  I would recommend a new audiogram.  Depending on the degree of hearing loss particular in the mid to low frequencies he might benefit from a hearing aid.  If it is otherwise stable then likely would continue to observe.      I spent a total of 35 minutes face-to-face with Niko Nance during today's office visit.  Over 50% of this time was spent counseling the patient on and/or coordinating care as documented in my assessment and plan.        Again, thank you for allowing me to participate in the care of your patient.        Sincerely,        Kiara Browne MD

## 2020-08-31 NOTE — PROGRESS NOTES
Otolaryngology Adult Consultation    Patient: Niko Nance  : 1985        HPI:  Niko Nance is a 35 year old male seen today in the Otolaryngology Clinic for possible fish bone in throat.  Patient reports that around  he felt like he had a fishbone stuck in his throat.  It did eventually pass he reports but since then he has been left with right-sided residual throat discomfort.  Feels like it is directly behind his tongue.  He has been looking himself but he has not been able to see anything.  He does have a history of right-sided hearing loss.  He does feel like his right ear also feels different to him. And he has been having headaches (frontal) associated with this.      Medications:  Current Outpatient Rx   Medication Sig Dispense Refill     gabapentin (NEURONTIN) 100 MG capsule Take 3 capsules (300 mg) by mouth 3 times daily 270 capsule 1     finasteride (PROPECIA) 1 MG tablet Take 1 mg by mouth         Allergies: Patient has no known allergies.     PMH:  No past medical history on file.    PSH:  No past surgical history on file.    FH:  No family history on file.     SH:  Social History     Tobacco Use     Smoking status: Never Smoker     Smokeless tobacco: Never Used   Substance Use Topics     Alcohol use: Not on file     Drug use: Not on file       Review of Systems  No flowsheet data found.    Physical Exam:    GEN:  The patient is alert, oriented and in no acute distress.  HEAD:  Head, face scalp is grossly normal.  EARS:  Ears demonstrate normal canals, auricles and tympanic membranes.  NOSE:  External nose is straight, skin is normal.                Intranasal exam (anterior rhinoscopy) reveals normal moist mucosa, turbinate                  tissue without edema, erythema or crusting.  Septum with a left-sided septal spur.    ORAL:  Oral cavity shows healthy mucosa with out ulceration, masses or other lesions                involving the tongue, palate, buccal mucosa, floor of mouth or  gingiva.  No foreign bodies are seen.  NECK:   Neck is without adenopathy, thyroid or salivary gland masses.  PUL: normal work of breathing; no stridor  CV: RRR; no peripheral edema  M/S: normal muscle tone     Laryngoscopy is performed to examine the upper airway for pathology, functional or anatomic abnormality.  After application of topical anesthetic/decongestant solution the flexible endoscope was passed into each nasal cavity separately.  Findings are as follows:   Nasal passages without abnormality.     Nasopharynx:  Clear, no lesions, crusting or inflammation   Base of tongue, vallecula, epiglottis, aryepiglottic folds, false vocal folds without mucosal lesions, masses or anatomic abnormality.   Glottis with normal movement of vocal folds, normal mucosa and no lesions   Pyriform sinuses, post-cricoid area, and posterior pharyngeal wall without lesions.   There is mild edema in the postcricoid area.    I reviewed an audiogram from January 9, 2013 which showed is a significant mid to high-frequency sensorineural hearing loss on the right side.  Normal hearing on the left side.    Assessment/Plan: Patient presents for evaluation of possible foreign body in his throat.  Though his main complaint is right ear ache and headache that he has been having since that event.  I reassured him that I did not see any evidence of fishbone or other foreign body in his throat.  There is no ulceration or masses seen.  He does have a little bit of post cricoid edema which might be suggestive of reflux.  Patient does deal with reflex.  It might be helpful for him to increase or be more consistent with his reflux medications for the next week or 2.  The ear discomfort might also be referred pain from his throat.  I think the headaches are more tension related.  At this time I would recommend observation.  As for his right ear hearing loss, he wonders if there is anything that can be done about it currently.  I would recommend a  new audiogram.  Depending on the degree of hearing loss particular in the mid to low frequencies he might benefit from a hearing aid.  If it is otherwise stable then likely would continue to observe.      I spent a total of 35 minutes face-to-face with Niko Nance during today's office visit.  Over 50% of this time was spent counseling the patient on and/or coordinating care as documented in my assessment and plan.

## 2020-08-31 NOTE — NURSING NOTE
Niko Nance's goals for this visit include:   Chief Complaint   Patient presents with     Throat Problem     Feels something sharp near the back of tongue, feels like he has a constant right ear infection - headache on right side     He requests these members of his care team be copied on today's visit information: Yes    PCP: Tobi Jansen    Referring Provider:  No referring provider defined for this encounter.    BP (!) 158/88 (BP Location: Right arm, Patient Position: Sitting, Cuff Size: Adult Large)   Pulse 67   SpO2 98%     Do you need any medication refills at today's visit? No    Sandra Hatch LPN

## 2020-08-31 NOTE — PATIENT INSTRUCTIONS
[phone] is our direct clinic phone number if you feel your throat gets worse, please call us- we have a provider in Mondays, Tuesdays, and Fridays right now that could see you    To schedule your hearing test please call: [phone]  Dr. Patel Ibrahim

## 2020-11-22 ENCOUNTER — HEALTH MAINTENANCE LETTER (OUTPATIENT)
Age: 35
End: 2020-11-22

## 2021-09-18 ENCOUNTER — HEALTH MAINTENANCE LETTER (OUTPATIENT)
Age: 36
End: 2021-09-18

## 2022-01-08 ENCOUNTER — HEALTH MAINTENANCE LETTER (OUTPATIENT)
Age: 37
End: 2022-01-08

## 2022-11-20 ENCOUNTER — HEALTH MAINTENANCE LETTER (OUTPATIENT)
Age: 37
End: 2022-11-20

## 2023-04-15 ENCOUNTER — HEALTH MAINTENANCE LETTER (OUTPATIENT)
Age: 38
End: 2023-04-15